# Patient Record
Sex: MALE | Race: WHITE | NOT HISPANIC OR LATINO | URBAN - METROPOLITAN AREA
[De-identification: names, ages, dates, MRNs, and addresses within clinical notes are randomized per-mention and may not be internally consistent; named-entity substitution may affect disease eponyms.]

---

## 2017-10-04 ENCOUNTER — INPATIENT (INPATIENT)
Facility: HOSPITAL | Age: 47
LOS: 4 days | Discharge: HOME | End: 2017-10-09
Attending: INTERNAL MEDICINE

## 2017-10-04 DIAGNOSIS — F10.20 ALCOHOL DEPENDENCE, UNCOMPLICATED: ICD-10-CM

## 2017-10-04 DIAGNOSIS — F93.0 SEPARATION ANXIETY DISORDER OF CHILDHOOD: ICD-10-CM

## 2017-10-12 DIAGNOSIS — Z63.0 PROBLEMS IN RELATIONSHIP WITH SPOUSE OR PARTNER: ICD-10-CM

## 2017-10-12 DIAGNOSIS — Z63.4 DISAPPEARANCE AND DEATH OF FAMILY MEMBER: ICD-10-CM

## 2017-10-12 DIAGNOSIS — F13.20 SEDATIVE, HYPNOTIC OR ANXIOLYTIC DEPENDENCE, UNCOMPLICATED: ICD-10-CM

## 2017-10-12 DIAGNOSIS — F32.9 MAJOR DEPRESSIVE DISORDER, SINGLE EPISODE, UNSPECIFIED: ICD-10-CM

## 2017-10-12 DIAGNOSIS — R45.851 SUICIDAL IDEATIONS: ICD-10-CM

## 2017-10-12 DIAGNOSIS — F11.20 OPIOID DEPENDENCE, UNCOMPLICATED: ICD-10-CM

## 2017-10-12 DIAGNOSIS — F17.210 NICOTINE DEPENDENCE, CIGARETTES, UNCOMPLICATED: ICD-10-CM
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
## 2017-10-12 DIAGNOSIS — F10.20 ALCOHOL DEPENDENCE, UNCOMPLICATED: ICD-10-CM

## 2017-10-12 DIAGNOSIS — F43.10 POST-TRAUMATIC STRESS DISORDER, UNSPECIFIED: ICD-10-CM

## 2017-10-12 DIAGNOSIS — F12.20 CANNABIS DEPENDENCE, UNCOMPLICATED: ICD-10-CM

## 2017-10-12 DIAGNOSIS — I10 ESSENTIAL (PRIMARY) HYPERTENSION: ICD-10-CM

## 2017-10-12 SDOH — SOCIAL STABILITY - SOCIAL INSECURITY: PROBLEMS IN RELATIONSHIP WITH SPOUSE OR PARTNER: Z63.0

## 2017-10-12 SDOH — SOCIAL STABILITY - SOCIAL INSECURITY: DISSAPEARANCE AND DEATH OF FAMILY MEMBER: Z63.4

## 2017-10-17 ENCOUNTER — OUTPATIENT (OUTPATIENT)
Dept: OUTPATIENT SERVICES | Facility: HOSPITAL | Age: 47
LOS: 1 days | Discharge: HOME | End: 2017-10-17

## 2017-10-17 DIAGNOSIS — F10.20 ALCOHOL DEPENDENCE, UNCOMPLICATED: ICD-10-CM

## 2017-10-17 DIAGNOSIS — F93.0 SEPARATION ANXIETY DISORDER OF CHILDHOOD: ICD-10-CM

## 2017-11-14 ENCOUNTER — OUTPATIENT (OUTPATIENT)
Dept: OUTPATIENT SERVICES | Facility: HOSPITAL | Age: 47
LOS: 1 days | Discharge: HOME | End: 2017-11-14

## 2017-11-14 DIAGNOSIS — F10.20 ALCOHOL DEPENDENCE, UNCOMPLICATED: ICD-10-CM

## 2018-05-05 ENCOUNTER — EMERGENCY (EMERGENCY)
Facility: HOSPITAL | Age: 48
LOS: 0 days | Discharge: HOME | End: 2018-05-05
Attending: EMERGENCY MEDICINE | Admitting: EMERGENCY MEDICINE

## 2018-05-05 VITALS
TEMPERATURE: 98 F | DIASTOLIC BLOOD PRESSURE: 92 MMHG | RESPIRATION RATE: 16 BRPM | SYSTOLIC BLOOD PRESSURE: 158 MMHG | OXYGEN SATURATION: 99 % | WEIGHT: 179.9 LBS | HEIGHT: 70 IN | HEART RATE: 91 BPM

## 2018-05-05 VITALS
RESPIRATION RATE: 18 BRPM | DIASTOLIC BLOOD PRESSURE: 88 MMHG | OXYGEN SATURATION: 99 % | HEART RATE: 75 BPM | TEMPERATURE: 97 F | SYSTOLIC BLOOD PRESSURE: 150 MMHG

## 2018-05-05 DIAGNOSIS — F17.200 NICOTINE DEPENDENCE, UNSPECIFIED, UNCOMPLICATED: ICD-10-CM

## 2018-05-05 DIAGNOSIS — F32.9 MAJOR DEPRESSIVE DISORDER, SINGLE EPISODE, UNSPECIFIED: ICD-10-CM

## 2018-05-05 DIAGNOSIS — R10.10 UPPER ABDOMINAL PAIN, UNSPECIFIED: ICD-10-CM

## 2018-05-05 DIAGNOSIS — Z88.8 ALLERGY STATUS TO OTHER DRUGS, MEDICAMENTS AND BIOLOGICAL SUBSTANCES: ICD-10-CM

## 2018-05-05 DIAGNOSIS — I10 ESSENTIAL (PRIMARY) HYPERTENSION: ICD-10-CM

## 2018-05-05 DIAGNOSIS — F13.10 SEDATIVE, HYPNOTIC OR ANXIOLYTIC ABUSE, UNCOMPLICATED: ICD-10-CM

## 2018-05-05 LAB
ALBUMIN SERPL ELPH-MCNC: 4.5 G/DL — SIGNIFICANT CHANGE UP (ref 3.5–5.2)
ALP SERPL-CCNC: 111 U/L — SIGNIFICANT CHANGE UP (ref 30–115)
ALT FLD-CCNC: 25 U/L — SIGNIFICANT CHANGE UP (ref 0–41)
ANION GAP SERPL CALC-SCNC: 11 MMOL/L — SIGNIFICANT CHANGE UP (ref 7–14)
AST SERPL-CCNC: 47 U/L — HIGH (ref 0–41)
BILIRUB SERPL-MCNC: 1.1 MG/DL — SIGNIFICANT CHANGE UP (ref 0.2–1.2)
BUN SERPL-MCNC: 18 MG/DL — SIGNIFICANT CHANGE UP (ref 10–20)
CALCIUM SERPL-MCNC: 9.4 MG/DL — SIGNIFICANT CHANGE UP (ref 8.5–10.1)
CHLORIDE SERPL-SCNC: 97 MMOL/L — LOW (ref 98–110)
CK MB CFR SERPL CALC: 10 NG/ML — HIGH (ref 0.6–6.3)
CK SERPL-CCNC: 845 U/L — HIGH (ref 0–225)
CO2 SERPL-SCNC: 29 MMOL/L — SIGNIFICANT CHANGE UP (ref 17–32)
CREAT SERPL-MCNC: 1 MG/DL — SIGNIFICANT CHANGE UP (ref 0.7–1.5)
GLUCOSE SERPL-MCNC: 106 MG/DL — HIGH (ref 70–99)
HCT VFR BLD CALC: 43.2 % — SIGNIFICANT CHANGE UP (ref 42–52)
HGB BLD-MCNC: 14.8 G/DL — SIGNIFICANT CHANGE UP (ref 14–18)
LIDOCAIN IGE QN: 58 U/L — SIGNIFICANT CHANGE UP (ref 7–60)
MCHC RBC-ENTMCNC: 29.4 PG — SIGNIFICANT CHANGE UP (ref 27–31)
MCHC RBC-ENTMCNC: 34.3 G/DL — SIGNIFICANT CHANGE UP (ref 32–37)
MCV RBC AUTO: 85.7 FL — SIGNIFICANT CHANGE UP (ref 80–94)
NRBC # BLD: 0 /100 WBCS — SIGNIFICANT CHANGE UP (ref 0–0)
PLATELET # BLD AUTO: 225 K/UL — SIGNIFICANT CHANGE UP (ref 130–400)
POTASSIUM SERPL-MCNC: 3.8 MMOL/L — SIGNIFICANT CHANGE UP (ref 3.5–5)
POTASSIUM SERPL-SCNC: 3.8 MMOL/L — SIGNIFICANT CHANGE UP (ref 3.5–5)
PROT SERPL-MCNC: 7 G/DL — SIGNIFICANT CHANGE UP (ref 6–8)
RBC # BLD: 5.04 M/UL — SIGNIFICANT CHANGE UP (ref 4.7–6.1)
RBC # FLD: 12.6 % — SIGNIFICANT CHANGE UP (ref 11.5–14.5)
SODIUM SERPL-SCNC: 137 MMOL/L — SIGNIFICANT CHANGE UP (ref 135–146)
TROPONIN T SERPL-MCNC: <0.01 NG/ML — SIGNIFICANT CHANGE UP
WBC # BLD: 8.84 K/UL — SIGNIFICANT CHANGE UP (ref 4.8–10.8)
WBC # FLD AUTO: 8.84 K/UL — SIGNIFICANT CHANGE UP (ref 4.8–10.8)

## 2018-05-05 RX ADMIN — Medication 50 MILLIGRAM(S): at 06:45

## 2018-05-05 NOTE — ED PROVIDER NOTE - OBJECTIVE STATEMENT
47m w a hx of HTN and polysubstance abuse. Pt presents requesting detox eval. Pt reports last drink was 2 hours prior to arrival. Pt reporting no symptoms at this time. Pt denies SI/HI, denies self-harm attempt or OD, denies AV hallucinations, denies tremors, denies seizures. Pt reports an episode of chest pain 3 days prior while at rest that resolved. No prior hx of CAD/DVT/PE, no recent travel/hosp/immobilization. Pt also reports "if there's no detox availability, I'd like to stay in psych"

## 2018-05-05 NOTE — ED ADULT NURSE NOTE - CHIEF COMPLAINT QUOTE
Pt requesting detox from alcohol and xanax. Pt also would like to speak with a psychiatrist for his PTSD. Pt denies HI and SI.

## 2018-05-05 NOTE — ED PROVIDER NOTE - NS ED ROS FT
Review of Systems  Constitutional:  No fever or chills.   Eyes:  Negative  ENMT:  No nasal congestion, discharge, or throat pain.  Cardiac:  No palpitations, syncope, or edema.  Respiratory:  No dyspnea, wheezing, or cough. No hemoptysis.  GI:  No vomiting, diarrhea, or abdominal pain. No melena or hematochezia.  :  No dysuria or hematuria.   Musculoskeletal:  No joint swelling or joint pain. Chronic back pain unchanged  Skin:  No skin rash, jaundice, or lesions.  Neuro:  No headache, loss of sensation, tremors, or focal weakness.  No change in mental status, no AV hallucinations.

## 2018-05-05 NOTE — ED ADULT NURSE NOTE - PMH
Alcohol abuse    Benzodiazepine abuse    Depression    HTN (hypertension)    PTSD (post-traumatic stress disorder)

## 2018-05-05 NOTE — ED PROVIDER NOTE - PHYSICAL EXAMINATION
Physical Exam  General: Awake, alert, NAD, WDWN, non-toxic appearing, NCAT  Eyes: PERRL, EOMI, no icterus, lids and conjunctivae are normal  ENT: External inspection normal, pink/moist membranes, pharynx normal  CV: S1S2, regular rate and rhythm, no murmur/gallops/rubs, no JVD, 2+ pulses b/l, no edema/cords/homans, warm/well-perfused  Respiratory: Normal respiratory rate/effort, no respiratory distress, normal voice, speaking full sentences, lungs clear to auscultation b/l, no wheezing/rales/rhonchi, no retractions, no stridor  Abdomen: Soft abdomen, no tender/distended/guarding/rebound, no CVA tender  Musculoskeletal: FROM all 4 extremities, N/V intact, no ranulfo tender/deform, stable gait  Neck: FROM neck, supple, no meningismus, trachea midline, no JVD  Integumentary: Color normal for race, warm and dry, no rash  Neuro: Oriented x3, CN 2-12 intact, normal motor, normal sensory, normal gait, normal cerebellar, no tremors, no clonus/rigidity/hyper-reflexia.  Psych: Oriented x3, mood normal, affect normal, denies SI/HI, denies AV hallucinations

## 2018-05-05 NOTE — ED PROVIDER NOTE - PMH
Alcohol abuse    Benzodiazepine abuse    Depression    HTN (hypertension)    PTSD (post-traumatic stress disorder) Alcohol abuse    Benzodiazepine abuse    Depression    HTN (hypertension)    Opioid abuse    PTSD (post-traumatic stress disorder)

## 2018-05-05 NOTE — ED PROVIDER NOTE - MEDICAL DECISION MAKING DETAILS
47m w polysubstance abuse requesting detox eval. also reporting episode of chest pain 3 days ago that resolved. no evidence of withdrawal during eval or ED observation. labs & EKG checked. librium given prior to d/c. Pt advised regarding symptomatic/supportive care, importance of PMD & outpatient Detox f/u, and symptoms to prompt ED return. DISPLAY PLAN FREE TEXT

## 2018-05-05 NOTE — ED PROVIDER NOTE - PLAN OF CARE
47m w polysubstance abuse requesting detox eval. also reporting episode of chest pain 3 days ago that resolved. --Will check EKG/enzymes, discuss care w detox for bed availability

## 2018-05-05 NOTE — ED PROVIDER NOTE - CARE PLAN
Assessment and plan of treatment:	47m w polysubstance abuse requesting detox eval. also reporting episode of chest pain 3 days ago that resolved. --Will check EKG/enzymes, discuss care w detox for bed availability Principal Discharge DX:	Alcohol abuse  Assessment and plan of treatment:	47m w polysubstance abuse requesting detox eval. also reporting episode of chest pain 3 days ago that resolved. --Will check EKG/enzymes, discuss care w detox for bed availability  Secondary Diagnosis:	Benzodiazepine abuse

## 2018-05-07 ENCOUNTER — INPATIENT (INPATIENT)
Facility: HOSPITAL | Age: 48
LOS: 7 days | Discharge: LEFT BEFORE DISCHARGE | End: 2018-05-15
Attending: INTERNAL MEDICINE | Admitting: INTERNAL MEDICINE

## 2018-05-07 VITALS
HEIGHT: 70 IN | OXYGEN SATURATION: 96 % | HEART RATE: 91 BPM | DIASTOLIC BLOOD PRESSURE: 97 MMHG | WEIGHT: 184.97 LBS | RESPIRATION RATE: 18 BRPM | TEMPERATURE: 97 F | SYSTOLIC BLOOD PRESSURE: 122 MMHG

## 2018-05-07 DIAGNOSIS — F19.20 OTHER PSYCHOACTIVE SUBSTANCE DEPENDENCE, UNCOMPLICATED: ICD-10-CM

## 2018-05-07 DIAGNOSIS — R45.851 SUICIDAL IDEATIONS: ICD-10-CM

## 2018-05-07 DIAGNOSIS — F43.10 POST-TRAUMATIC STRESS DISORDER, UNSPECIFIED: ICD-10-CM

## 2018-05-07 DIAGNOSIS — F41.9 ANXIETY DISORDER, UNSPECIFIED: ICD-10-CM

## 2018-05-07 DIAGNOSIS — F32.9 MAJOR DEPRESSIVE DISORDER, SINGLE EPISODE, UNSPECIFIED: ICD-10-CM

## 2018-05-07 DIAGNOSIS — F11.20 OPIOID DEPENDENCE, UNCOMPLICATED: ICD-10-CM

## 2018-05-07 DIAGNOSIS — F17.210 NICOTINE DEPENDENCE, CIGARETTES, UNCOMPLICATED: ICD-10-CM

## 2018-05-07 DIAGNOSIS — F13.20 SEDATIVE, HYPNOTIC OR ANXIOLYTIC DEPENDENCE, UNCOMPLICATED: ICD-10-CM

## 2018-05-07 DIAGNOSIS — F10.20 ALCOHOL DEPENDENCE, UNCOMPLICATED: ICD-10-CM

## 2018-05-07 DIAGNOSIS — Z51.89 ENCOUNTER FOR OTHER SPECIFIED AFTERCARE: ICD-10-CM

## 2018-05-07 DIAGNOSIS — F12.20 CANNABIS DEPENDENCE, UNCOMPLICATED: ICD-10-CM

## 2018-05-07 DIAGNOSIS — I10 ESSENTIAL (PRIMARY) HYPERTENSION: ICD-10-CM

## 2018-05-07 LAB
ALBUMIN SERPL ELPH-MCNC: 4.4 G/DL — SIGNIFICANT CHANGE UP (ref 3.5–5.2)
ALP SERPL-CCNC: 107 U/L — SIGNIFICANT CHANGE UP (ref 30–115)
ALT FLD-CCNC: 24 U/L — SIGNIFICANT CHANGE UP (ref 0–41)
AMMONIA BLD-MCNC: 40 UMOL/L — SIGNIFICANT CHANGE UP (ref 11–55)
ANION GAP SERPL CALC-SCNC: 14 MMOL/L — SIGNIFICANT CHANGE UP (ref 7–14)
APAP SERPL-MCNC: <5 UG/ML — LOW (ref 10–30)
APTT BLD: 30.1 SEC — SIGNIFICANT CHANGE UP (ref 27–39.2)
AST SERPL-CCNC: 33 U/L — SIGNIFICANT CHANGE UP (ref 0–41)
BASOPHILS # BLD AUTO: 0.1 K/UL — SIGNIFICANT CHANGE UP (ref 0–0.2)
BASOPHILS NFR BLD AUTO: 1.1 % — HIGH (ref 0–1)
BILIRUB SERPL-MCNC: 0.7 MG/DL — SIGNIFICANT CHANGE UP (ref 0.2–1.2)
BUN SERPL-MCNC: 9 MG/DL — LOW (ref 10–20)
CALCIUM SERPL-MCNC: 8.8 MG/DL — SIGNIFICANT CHANGE UP (ref 8.5–10.1)
CHLORIDE SERPL-SCNC: 100 MMOL/L — SIGNIFICANT CHANGE UP (ref 98–110)
CO2 SERPL-SCNC: 22 MMOL/L — SIGNIFICANT CHANGE UP (ref 17–32)
CREAT SERPL-MCNC: 0.9 MG/DL — SIGNIFICANT CHANGE UP (ref 0.7–1.5)
EOSINOPHIL # BLD AUTO: 0.23 K/UL — SIGNIFICANT CHANGE UP (ref 0–0.7)
EOSINOPHIL NFR BLD AUTO: 2.6 % — SIGNIFICANT CHANGE UP (ref 0–8)
ETHANOL SERPL-MCNC: <10 MG/DL — HIGH
GLUCOSE SERPL-MCNC: 101 MG/DL — HIGH (ref 70–99)
HCT VFR BLD CALC: 45.6 % — SIGNIFICANT CHANGE UP (ref 42–52)
HGB BLD-MCNC: 15.3 G/DL — SIGNIFICANT CHANGE UP (ref 14–18)
IMM GRANULOCYTES NFR BLD AUTO: 0.3 % — SIGNIFICANT CHANGE UP (ref 0.1–0.3)
INR BLD: 1.03 RATIO — SIGNIFICANT CHANGE UP (ref 0.65–1.3)
LIDOCAIN IGE QN: 49 U/L — SIGNIFICANT CHANGE UP (ref 7–60)
LYMPHOCYTES # BLD AUTO: 1.67 K/UL — SIGNIFICANT CHANGE UP (ref 1.2–3.4)
LYMPHOCYTES # BLD AUTO: 18.5 % — LOW (ref 20.5–51.1)
MAGNESIUM SERPL-MCNC: 1.8 MG/DL — SIGNIFICANT CHANGE UP (ref 1.8–2.4)
MCHC RBC-ENTMCNC: 29 PG — SIGNIFICANT CHANGE UP (ref 27–31)
MCHC RBC-ENTMCNC: 33.6 G/DL — SIGNIFICANT CHANGE UP (ref 32–37)
MCV RBC AUTO: 86.4 FL — SIGNIFICANT CHANGE UP (ref 80–94)
MONOCYTES # BLD AUTO: 0.6 K/UL — SIGNIFICANT CHANGE UP (ref 0.1–0.6)
MONOCYTES NFR BLD AUTO: 6.7 % — SIGNIFICANT CHANGE UP (ref 1.7–9.3)
NEUTROPHILS # BLD AUTO: 6.38 K/UL — SIGNIFICANT CHANGE UP (ref 1.4–6.5)
NEUTROPHILS NFR BLD AUTO: 70.8 % — SIGNIFICANT CHANGE UP (ref 42.2–75.2)
NRBC # BLD: 0 /100 WBCS — SIGNIFICANT CHANGE UP (ref 0–0)
PLATELET # BLD AUTO: 248 K/UL — SIGNIFICANT CHANGE UP (ref 130–400)
POTASSIUM SERPL-MCNC: 4 MMOL/L — SIGNIFICANT CHANGE UP (ref 3.5–5)
POTASSIUM SERPL-SCNC: 4 MMOL/L — SIGNIFICANT CHANGE UP (ref 3.5–5)
PROT SERPL-MCNC: 6.9 G/DL — SIGNIFICANT CHANGE UP (ref 6–8)
PROTHROM AB SERPL-ACNC: 11.1 SEC — SIGNIFICANT CHANGE UP (ref 9.95–12.87)
RBC # BLD: 5.28 M/UL — SIGNIFICANT CHANGE UP (ref 4.7–6.1)
RBC # FLD: 12.5 % — SIGNIFICANT CHANGE UP (ref 11.5–14.5)
SALICYLATES SERPL-MCNC: 0.5 MG/DL — LOW (ref 4–30)
SODIUM SERPL-SCNC: 136 MMOL/L — SIGNIFICANT CHANGE UP (ref 135–146)
WBC # BLD: 9.01 K/UL — SIGNIFICANT CHANGE UP (ref 4.8–10.8)
WBC # FLD AUTO: 9.01 K/UL — SIGNIFICANT CHANGE UP (ref 4.8–10.8)

## 2018-05-07 RX ORDER — PHENOBARBITAL 60 MG
32.4 TABLET ORAL EVERY 6 HOURS
Qty: 0 | Refills: 0 | Status: DISCONTINUED | OUTPATIENT
Start: 2018-05-09 | End: 2018-05-09

## 2018-05-07 RX ORDER — PHENOBARBITAL 60 MG
32.4 TABLET ORAL EVERY 12 HOURS
Qty: 0 | Refills: 0 | Status: DISCONTINUED | OUTPATIENT
Start: 2018-05-10 | End: 2018-05-11

## 2018-05-07 RX ORDER — PHENOBARBITAL 60 MG
32.4 TABLET ORAL EVERY 6 HOURS
Qty: 0 | Refills: 0 | Status: DISCONTINUED | OUTPATIENT
Start: 2018-05-07 | End: 2018-05-09

## 2018-05-07 RX ORDER — PHENOBARBITAL 60 MG
TABLET ORAL
Qty: 0 | Refills: 0 | Status: COMPLETED | OUTPATIENT
Start: 2018-05-07 | End: 2018-05-11

## 2018-05-07 RX ORDER — FOLIC ACID 0.8 MG
1 TABLET ORAL DAILY
Qty: 0 | Refills: 0 | Status: DISCONTINUED | OUTPATIENT
Start: 2018-05-07 | End: 2018-05-11

## 2018-05-07 RX ORDER — HYDROXYZINE HCL 10 MG
100 TABLET ORAL AT BEDTIME
Qty: 0 | Refills: 0 | Status: DISCONTINUED | OUTPATIENT
Start: 2018-05-07 | End: 2018-05-11

## 2018-05-07 RX ORDER — THIAMINE MONONITRATE (VIT B1) 100 MG
100 TABLET ORAL DAILY
Qty: 0 | Refills: 0 | Status: DISCONTINUED | OUTPATIENT
Start: 2018-05-07 | End: 2018-05-11

## 2018-05-07 RX ORDER — ACETAMINOPHEN 500 MG
650 TABLET ORAL EVERY 6 HOURS
Qty: 0 | Refills: 0 | Status: DISCONTINUED | OUTPATIENT
Start: 2018-05-07 | End: 2018-05-11

## 2018-05-07 RX ORDER — PHENOBARBITAL 60 MG
32.4 TABLET ORAL ONCE
Qty: 0 | Refills: 0 | Status: DISCONTINUED | OUTPATIENT
Start: 2018-05-07 | End: 2018-05-07

## 2018-05-07 RX ADMIN — Medication 32.4 MILLIGRAM(S): at 23:38

## 2018-05-07 NOTE — ED ADULT NURSE REASSESSMENT NOTE - NS ED NURSE REASSESS COMMENT FT1
Pt asleep; calm/ cooperative; appears comfortable; 1:1 observation in progress. Safety/environment checked.
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
## 2018-05-07 NOTE — CONSULT NOTE ADULT - ASSESSMENT
alcohol intoxication dependence   benzo abuse    no need for psych intervention  cleared for in patient detox admission.

## 2018-05-07 NOTE — H&P ADULT - ATTENDING COMMENTS
Patient interviewed and examined.    Chart reviewed.    PA's H&P noted and modified, as appropriate.    Case discussed on team rounds    Following is my summary of the case.    Admitted for detox: from __X__ED, ___Intake, ____Med/Surg Floor    Alcohol__X__   Opioid_____  Benzo_X__ Other_____    Substance amount, duration of use, last usage, and prior attempts at detox or rehabs, are outlined above in the H&P and discussed with patient.    Associated withdrawal symptoms presents.  Comorbid conditions noted. Chronic and Stable.    Past Medical Hx, Psych Hx, family Hx, Social Hx from H&P reviewed and NO changes.    Old medical record and medication Hx. Reviewed    Following items reviewed and addressed:  1. labs  2. EKG  3. Imaging from PACs module    Examination: no change from PA's exam.    Place on following protocol  _____Medically Managed  __X__Medically Supervised    Ciwa_____Librium taper____Ativan taper___Methadone taper___ Phenobarb taper_X___ Suboxone Induction____MMTP____    Narcan Kit Offered    Psych Consult __X__N/A  ___Ordered    Physical Therapy  ___X N/A    ___  Ordered    Aftercare disposition to be addressed by counselors.    Estimated length of stay 3-5 days.

## 2018-05-07 NOTE — ED PROVIDER NOTE - ATTENDING CONTRIBUTION TO CARE
I personally evaluated the patient. I reviewed the Resident’s or Physician Assistant’s note (as assigned above), and agree with the findings and plan except as documented in my note. Pt comes in requesting detox from alcohol and benzos. Last drink several hours PTA. No trauma, no SI/HI/AVH. Not tremulous, no fever, chills. On exam: NCAT. PERRLA, EOMI. OP clear. Lungs CTAB. RRR, S1S2 noted. Radial pulses 2+ and equal, pedal pulses 2+ and equal. Abdomen soft, NT/ND, no rebound or guarding. FROM x4 extremities. No focal neuro deficits.   Psych consult and cleared for detox.

## 2018-05-07 NOTE — CONSULT NOTE ADULT - SUBJECTIVE AND OBJECTIVE BOX
47m w a hx of HTN and polysubstance abuse. Pt presented to intake  requesting detox eval. Pt reports last drink was 2 hours prior to arrival. was sent to ed for psych evaluation. he reports he was high and did not know what he said and why he was sent here. denies any psych symptoms at this time. Pt denies SI/HI, denies self-harm attempt or OD, denies AV hallucinations, denies tremors, denies seizures.     hx of alcohol, benzo dependence. multiple detox and rehab no ipp not on any psych medications.    alert ox3 mood stable no threat to self or others. i/j fair.

## 2018-05-07 NOTE — ED PROVIDER NOTE - OBJECTIVE STATEMENT
Sent from intake for psych clearance, Patient requests detox, Denies any suicidal idations Sent from intake for psych clearance, Patient requests detox, Denies any suicidal ideations

## 2018-05-08 LAB — T PALLIDUM AB TITR SER: NEGATIVE — SIGNIFICANT CHANGE UP

## 2018-05-08 RX ORDER — PHENOBARBITAL 60 MG
32.4 TABLET ORAL EVERY 6 HOURS
Qty: 0 | Refills: 0 | Status: DISCONTINUED | OUTPATIENT
Start: 2018-05-08 | End: 2018-05-11

## 2018-05-08 RX ORDER — TUBERCULIN PURIFIED PROTEIN DERIVATIVE 5 [IU]/.1ML
5 INJECTION, SOLUTION INTRADERMAL ONCE
Qty: 0 | Refills: 0 | Status: COMPLETED | OUTPATIENT
Start: 2018-05-08 | End: 2018-05-09

## 2018-05-08 RX ADMIN — Medication 32.4 MILLIGRAM(S): at 17:32

## 2018-05-08 RX ADMIN — Medication 1 MILLIGRAM(S): at 11:58

## 2018-05-08 RX ADMIN — Medication 32.4 MILLIGRAM(S): at 05:56

## 2018-05-08 RX ADMIN — Medication 100 MILLIGRAM(S): at 09:43

## 2018-05-08 RX ADMIN — Medication 32.4 MILLIGRAM(S): at 11:58

## 2018-05-08 RX ADMIN — Medication 100 MILLIGRAM(S): at 01:09

## 2018-05-08 RX ADMIN — Medication 32.4 MILLIGRAM(S): at 01:09

## 2018-05-08 RX ADMIN — Medication 100 MILLIGRAM(S): at 22:49

## 2018-05-08 RX ADMIN — Medication 1 TABLET(S): at 09:43

## 2018-05-08 RX ADMIN — Medication 32.4 MILLIGRAM(S): at 09:43

## 2018-05-09 RX ORDER — NICOTINE POLACRILEX 2 MG
1 GUM BUCCAL DAILY
Qty: 0 | Refills: 0 | Status: DISCONTINUED | OUTPATIENT
Start: 2018-05-09 | End: 2018-05-11

## 2018-05-09 RX ADMIN — Medication 1 TABLET(S): at 08:35

## 2018-05-09 RX ADMIN — Medication 32.4 MILLIGRAM(S): at 00:57

## 2018-05-09 RX ADMIN — Medication 32.4 MILLIGRAM(S): at 11:22

## 2018-05-09 RX ADMIN — Medication 1 MILLIGRAM(S): at 08:35

## 2018-05-09 RX ADMIN — Medication 100 MILLIGRAM(S): at 08:35

## 2018-05-09 RX ADMIN — Medication 32.4 MILLIGRAM(S): at 17:19

## 2018-05-09 RX ADMIN — Medication 32.4 MILLIGRAM(S): at 06:09

## 2018-05-09 RX ADMIN — TUBERCULIN PURIFIED PROTEIN DERIVATIVE 5 UNIT(S): 5 INJECTION, SOLUTION INTRADERMAL at 14:44

## 2018-05-09 RX ADMIN — Medication 1 PATCH: at 09:13

## 2018-05-09 NOTE — CHART NOTE - NSCHARTNOTEFT_GEN_A_CORE
Subsequent Inpatient Encounter                                       Detox Unit    KELLY ANDRADE   47y   Male      Chief Complaint:    Follow up for Benzodiazipine  Dependency    HPI:     I reviewed previous notes.No Change, except if noted below.             Detail:_    ROS:   I reviewed with patient.  No changes from previous notes except if noted below.             Detail: _    PFSH I reviewed with patient. No changes from previous notes except if noted below.             Detail_    Medication reconciliation performed.    MEDICATIONS  (STANDING):  folic acid 1 milliGRAM(s) Oral daily  multivitamin 1 Tablet(s) Oral daily  PHENobarbital 32.4 milliGRAM(s) Oral every 6 hours  PHENobarbital   Oral   PPD  5 Tuberculin Unit(s) Injectable 5 Unit(s) IntraDermal once  thiamine 100 milliGRAM(s) Oral daily      MEDICATIONS  (PRN):  acetaminophen   Tablet 650 milliGRAM(s) Oral every 6 hours PRN For Temp greater than 38 C (100.4 F)  acetaminophen   Tablet. 650 milliGRAM(s) Oral every 6 hours PRN Mild Pain (1 - 3)  aluminum hydroxide/magnesium hydroxide/simethicone Suspension 30 milliLiter(s) Oral every 4 hours PRN Dyspepsia  hydrOXYzine hydrochloride 100 milliGRAM(s) Oral at bedtime PRN sleep  PHENobarbital 32.4 milliGRAM(s) Oral every 6 hours PRN wd      T(C): 35.9 (05-09-18 @ 06:19), Max: 36.2 (05-08-18 @ 07:55)  HR: 65 (05-09-18 @ 06:19) (53 - 69)  BP: 126/84 (05-09-18 @ 06:19) (126/84 - 145/86)  RR: 16 (05-09-18 @ 06:19) (16 - 16)  SpO2: --    PHYSICAL EXAM:      Constitutional: NAD, A&O x3    Eyes: PERRLA, no conjuctivitis    Neck: no lymphadenopathy    Respiratory: +air entry, no rales, no rhonchi, no wheezes    Cardiovascular: +S1 and S2, regular rate and rhythm    Gastrointestinal: +BS, soft, non-tender, not distended    Extremities:  no edema, no calf tenderness    Skin: no rashes, normal turgor                            15.3   9.01  )-----------( 248      ( 07 May 2018 14:49 )             45.6   05-07    136  |  100  |  9<L>  ----------------------------<  101<H>  4.0   |  22  |  0.9    Ca    8.8      07 May 2018 14:49  Mg     1.8     05-07    TPro  6.9  /  Alb  4.4  /  TBili  0.7  /  DBili  x   /  AST  33  /  ALT  24  /  AlkPhos  107  05-07  PT/INR - ( 07 May 2018 14:49 )   PT: 11.10 sec;   INR: 1.03 ratio         PTT - ( 07 May 2018 14:49 )  PTT:30.1 sec  Magnesium, Serum: 1.8 mg/dL (05-07-18 @ 14:49)  Ammonia, Serum: 40 umol/L (05-07-18 @ 14:49)  Treponema Pallidum Antibody Interpretation: Negative (05-07-18 @ 14:49)        Drug Screen 1, Urine Result: Done (05-07-18 @ 16:19)        Impression and Plan:    Primary Diagnosis:  Benzodiazipine Dependency                                Medication: Phenobarbitol Protocol    Secondary Diagnosis:                                                                                Medication:    Tertiary Diagnosis:                                                                                     Medication:      Continue Detox Protocols. Use of PRNS as needed for withdrawal and comfort.    Adjustments to protocols:    Labs/ Tests reviewed.    Tests ordered:     Likely Disposition: __x_Home       ___Rehab       ___Outpatient Program    ___Self Help     _____Other    Estimated Length of stay:__4__

## 2018-05-10 RX ORDER — TRAZODONE HCL 50 MG
100 TABLET ORAL AT BEDTIME
Qty: 0 | Refills: 0 | Status: DISCONTINUED | OUTPATIENT
Start: 2018-05-10 | End: 2018-05-11

## 2018-05-10 RX ADMIN — Medication 0.1 MILLIGRAM(S): at 07:43

## 2018-05-10 RX ADMIN — Medication 100 MILLIGRAM(S): at 22:04

## 2018-05-10 RX ADMIN — Medication 32.4 MILLIGRAM(S): at 07:43

## 2018-05-10 RX ADMIN — Medication 1 TABLET(S): at 09:05

## 2018-05-10 RX ADMIN — Medication 32.4 MILLIGRAM(S): at 17:37

## 2018-05-10 RX ADMIN — Medication 1 MILLIGRAM(S): at 09:05

## 2018-05-10 RX ADMIN — Medication 1 PATCH: at 09:05

## 2018-05-10 RX ADMIN — Medication 100 MILLIGRAM(S): at 09:05

## 2018-05-10 RX ADMIN — Medication 0.1 MILLIGRAM(S): at 14:14

## 2018-05-11 ENCOUNTER — INPATIENT (INPATIENT)
Facility: HOSPITAL | Age: 48
LOS: 3 days | Discharge: LEFT BEFORE DISCHARGE | End: 2018-05-15
Attending: INTERNAL MEDICINE | Admitting: INTERNAL MEDICINE

## 2018-05-11 VITALS
TEMPERATURE: 96 F | RESPIRATION RATE: 16 BRPM | HEART RATE: 76 BPM | WEIGHT: 179.9 LBS | DIASTOLIC BLOOD PRESSURE: 79 MMHG | SYSTOLIC BLOOD PRESSURE: 124 MMHG | HEIGHT: 70 IN

## 2018-05-11 VITALS
HEART RATE: 89 BPM | SYSTOLIC BLOOD PRESSURE: 127 MMHG | RESPIRATION RATE: 16 BRPM | DIASTOLIC BLOOD PRESSURE: 72 MMHG | TEMPERATURE: 98 F

## 2018-05-11 DIAGNOSIS — F10.20 ALCOHOL DEPENDENCE, UNCOMPLICATED: ICD-10-CM

## 2018-05-11 DIAGNOSIS — Z02.9 ENCOUNTER FOR ADMINISTRATIVE EXAMINATIONS, UNSPECIFIED: ICD-10-CM

## 2018-05-11 RX ORDER — HYDROXYZINE HCL 10 MG
50 TABLET ORAL EVERY 6 HOURS
Qty: 0 | Refills: 0 | Status: DISCONTINUED | OUTPATIENT
Start: 2018-05-11 | End: 2018-05-15

## 2018-05-11 RX ORDER — TRAZODONE HCL 50 MG
100 TABLET ORAL ONCE
Qty: 0 | Refills: 0 | Status: COMPLETED | OUTPATIENT
Start: 2018-05-11 | End: 2018-05-11

## 2018-05-11 RX ORDER — MAGNESIUM HYDROXIDE 400 MG/1
30 TABLET, CHEWABLE ORAL DAILY
Qty: 0 | Refills: 0 | Status: DISCONTINUED | OUTPATIENT
Start: 2018-05-11 | End: 2018-05-15

## 2018-05-11 RX ORDER — ACETAMINOPHEN 500 MG
650 TABLET ORAL EVERY 8 HOURS
Qty: 0 | Refills: 0 | Status: DISCONTINUED | OUTPATIENT
Start: 2018-05-11 | End: 2018-05-15

## 2018-05-11 RX ORDER — HYDROXYZINE HCL 10 MG
100 TABLET ORAL AT BEDTIME
Qty: 0 | Refills: 0 | Status: DISCONTINUED | OUTPATIENT
Start: 2018-05-11 | End: 2018-05-15

## 2018-05-11 RX ORDER — TRAZODONE HCL 50 MG
100 TABLET ORAL AT BEDTIME
Qty: 0 | Refills: 0 | Status: DISCONTINUED | OUTPATIENT
Start: 2018-05-11 | End: 2018-05-15

## 2018-05-11 RX ADMIN — Medication 100 MILLIGRAM(S): at 22:43

## 2018-05-11 RX ADMIN — Medication 1 PATCH: at 09:02

## 2018-05-11 RX ADMIN — Medication 32.4 MILLIGRAM(S): at 06:26

## 2018-05-11 RX ADMIN — Medication 100 MILLIGRAM(S): at 09:02

## 2018-05-11 RX ADMIN — Medication 1 TABLET(S): at 09:02

## 2018-05-11 RX ADMIN — MAGNESIUM HYDROXIDE 30 MILLILITER(S): 400 TABLET, CHEWABLE ORAL at 17:42

## 2018-05-11 RX ADMIN — Medication 1 MILLIGRAM(S): at 09:02

## 2018-05-11 NOTE — CHART NOTE - NSCHARTNOTEFT_GEN_A_CORE
Allergies:  NSAIDs     Diet: Regular    Activity: as tolerated    Follow up with    1. PMD in 2 weeks    2. Psych in 2 weeks    3.    Follow up for abnormal labs/tests    1.    Extra Instructions:      Flu Vaccine given  Yes_____         No______      Diagnosis:  Chemical Dependency   Maintain sobriety  refrain from all use      Patient Signature___________________________________________  Date_________________      Nurse Signature_____________________________________________Date_________________

## 2018-05-12 RX ADMIN — Medication 1 TABLET(S): at 08:58

## 2018-05-12 RX ADMIN — Medication 100 MILLIGRAM(S): at 21:16

## 2018-05-13 RX ADMIN — Medication 100 MILLIGRAM(S): at 21:00

## 2018-05-13 RX ADMIN — Medication 100 MILLIGRAM(S): at 21:01

## 2018-05-13 RX ADMIN — MAGNESIUM HYDROXIDE 30 MILLILITER(S): 400 TABLET, CHEWABLE ORAL at 08:10

## 2018-05-14 RX ADMIN — Medication 1 TABLET(S): at 08:23

## 2018-05-15 VITALS
HEART RATE: 65 BPM | DIASTOLIC BLOOD PRESSURE: 85 MMHG | RESPIRATION RATE: 18 BRPM | TEMPERATURE: 98 F | SYSTOLIC BLOOD PRESSURE: 148 MMHG

## 2018-05-15 RX ADMIN — Medication 1 TABLET(S): at 08:25

## 2018-05-15 RX ADMIN — Medication 50 MILLIGRAM(S): at 08:26

## 2018-05-15 NOTE — CHART NOTE - NSCHARTNOTEFT_GEN_A_CORE
Allergies:  NSAIDs (Swelling)      Diet: Regular    Activity: as tolerated    Follow up with    1. PMD in 2 weeks    2. Psych in 2 weeks        Extra Instructions:      Flu Vaccine given  Yes_____         No______      Diagnosis:  Chemical Dependency   Maintain sobriety  refrain from all use      Patient Signature___________________________________________  Date_________________      Nurse Signature_____________________________________________Date_________________ Allergies:  NSAIDs (Swelling)      Diet: Regular    Activity: as tolerated    Follow up with    1. PMD in 2 weeks    2. Psych in 2 weeks        Extra Instructions:      Flu Vaccine given  Yes_____         No______      Diagnosis:  Chemical Dependency   Maintain sobriety  refrain from all use      Patient Signature___________________________________________  Date_________________      Nurse Signature_____________________________________________Date_________________    *Patient signed AMA.  Advised Patient about risks and verbalized understanding.  Advised Patient to follow up with PCP ASAP*

## 2018-05-17 ENCOUNTER — EMERGENCY (EMERGENCY)
Facility: HOSPITAL | Age: 48
LOS: 0 days | Discharge: HOME | End: 2018-05-17
Attending: EMERGENCY MEDICINE | Admitting: EMERGENCY MEDICINE

## 2018-05-17 VITALS
RESPIRATION RATE: 18 BRPM | SYSTOLIC BLOOD PRESSURE: 172 MMHG | HEIGHT: 70 IN | WEIGHT: 184.97 LBS | HEART RATE: 104 BPM | TEMPERATURE: 99 F | DIASTOLIC BLOOD PRESSURE: 91 MMHG

## 2018-05-17 DIAGNOSIS — R45.851 SUICIDAL IDEATIONS: ICD-10-CM

## 2018-05-17 DIAGNOSIS — Z88.8 ALLERGY STATUS TO OTHER DRUGS, MEDICAMENTS AND BIOLOGICAL SUBSTANCES STATUS: ICD-10-CM

## 2018-05-17 DIAGNOSIS — Z79.899 OTHER LONG TERM (CURRENT) DRUG THERAPY: ICD-10-CM

## 2018-05-17 DIAGNOSIS — F32.9 MAJOR DEPRESSIVE DISORDER, SINGLE EPISODE, UNSPECIFIED: ICD-10-CM

## 2018-05-17 LAB
ANION GAP SERPL CALC-SCNC: 15 MMOL/L — HIGH (ref 7–14)
APAP SERPL-MCNC: <5 UG/ML — LOW (ref 10–30)
BASOPHILS # BLD AUTO: 0.15 K/UL — SIGNIFICANT CHANGE UP (ref 0–0.2)
BASOPHILS NFR BLD AUTO: 1.5 % — HIGH (ref 0–1)
BUN SERPL-MCNC: 13 MG/DL — SIGNIFICANT CHANGE UP (ref 10–20)
CALCIUM SERPL-MCNC: 9.3 MG/DL — SIGNIFICANT CHANGE UP (ref 8.5–10.1)
CHLORIDE SERPL-SCNC: 95 MMOL/L — LOW (ref 98–110)
CO2 SERPL-SCNC: 24 MMOL/L — SIGNIFICANT CHANGE UP (ref 17–32)
CREAT SERPL-MCNC: 1 MG/DL — SIGNIFICANT CHANGE UP (ref 0.7–1.5)
EOSINOPHIL # BLD AUTO: 0.12 K/UL — SIGNIFICANT CHANGE UP (ref 0–0.7)
EOSINOPHIL NFR BLD AUTO: 1.2 % — SIGNIFICANT CHANGE UP (ref 0–8)
ETHANOL SERPL-MCNC: <10 MG/DL — HIGH
GLUCOSE SERPL-MCNC: 131 MG/DL — HIGH (ref 70–99)
HCT VFR BLD CALC: 44.7 % — SIGNIFICANT CHANGE UP (ref 42–52)
HGB BLD-MCNC: 15.7 G/DL — SIGNIFICANT CHANGE UP (ref 14–18)
IMM GRANULOCYTES NFR BLD AUTO: 0.3 % — SIGNIFICANT CHANGE UP (ref 0.1–0.3)
LYMPHOCYTES # BLD AUTO: 2.38 K/UL — SIGNIFICANT CHANGE UP (ref 1.2–3.4)
LYMPHOCYTES # BLD AUTO: 24.4 % — SIGNIFICANT CHANGE UP (ref 20.5–51.1)
MCHC RBC-ENTMCNC: 29.6 PG — SIGNIFICANT CHANGE UP (ref 27–31)
MCHC RBC-ENTMCNC: 35.1 G/DL — SIGNIFICANT CHANGE UP (ref 32–37)
MCV RBC AUTO: 84.2 FL — SIGNIFICANT CHANGE UP (ref 80–94)
MONOCYTES # BLD AUTO: 0.68 K/UL — HIGH (ref 0.1–0.6)
MONOCYTES NFR BLD AUTO: 7 % — SIGNIFICANT CHANGE UP (ref 1.7–9.3)
NEUTROPHILS # BLD AUTO: 6.4 K/UL — SIGNIFICANT CHANGE UP (ref 1.4–6.5)
NEUTROPHILS NFR BLD AUTO: 65.6 % — SIGNIFICANT CHANGE UP (ref 42.2–75.2)
PLATELET # BLD AUTO: 333 K/UL — SIGNIFICANT CHANGE UP (ref 130–400)
POTASSIUM SERPL-MCNC: 3.8 MMOL/L — SIGNIFICANT CHANGE UP (ref 3.5–5)
POTASSIUM SERPL-SCNC: 3.8 MMOL/L — SIGNIFICANT CHANGE UP (ref 3.5–5)
RBC # BLD: 5.31 M/UL — SIGNIFICANT CHANGE UP (ref 4.7–6.1)
RBC # FLD: 12.1 % — SIGNIFICANT CHANGE UP (ref 11.5–14.5)
SALICYLATES SERPL-MCNC: <0.3 MG/DL — LOW (ref 4–30)
SODIUM SERPL-SCNC: 134 MMOL/L — LOW (ref 135–146)
WBC # BLD: 9.76 K/UL — SIGNIFICANT CHANGE UP (ref 4.8–10.8)
WBC # FLD AUTO: 9.76 K/UL — SIGNIFICANT CHANGE UP (ref 4.8–10.8)

## 2018-05-17 NOTE — CONSULT NOTE ADULT - ASSESSMENT
sedative hypnotic dependence  alcohol dependence    no need for ipp  f/u out patient addiction program.

## 2018-05-17 NOTE — ED PROVIDER NOTE - ATTENDING CONTRIBUTION TO CARE
I personally evaluated the patient. I reviewed the Resident’s or Physician Assistant’s note (as assigned above), and agree with the findings and plan except as documented in my note.  pt calm and cooperative in ED cleared by psych

## 2018-05-17 NOTE — ED ADULT NURSE NOTE - NS TRANSFER PATIENT BELONGINGS
Jewelry/Money (specify)/Other belongings/Clothing/Cell Phone/PDA (specify)/money vouchered by security; pt has umbrella

## 2018-05-17 NOTE — ED ADULT NURSE NOTE - OBJECTIVE STATEMENT
Patient arrived to the ED alert and oriented x3 ambulatory for thoughts of suicide.  Pt states that he does not have a plan but states that he is depressed.  H/o PTSD and major depression.  Pt placed on constant observation and placed close to the nursing station.  Pt presents calm.  Awaiting psych consult.  Blood labs drawn and sent/  Will continue to monitor and reassess,

## 2018-05-17 NOTE — CONSULT NOTE ADULT - SUBJECTIVE AND OBJECTIVE BOX
· Chief Complaint: The patient is a 47y Male complaining of psychiatric medication refill.	  · HPI Objective Statement: 47 yr old male, PMH of depression and polysubstance abuse, presenting for suicidal ideation for the last 6 months, worse for the last few days, states he was in detox last week and left AMA 2 days ago because he claims the people around were making his thoughts of suicide worse. No plan, no hallucinations. Takes mirtazapine, sees a psychiatrist on SI.	    pt is known to me and service through recent admission to detox. continued use of benzos. currently comfortable sleeping and easily aroused and engaging.  no evidence of psychosis. no active s /h ideations.    recent ama from detox unit.    oriented x 3 mood stable no threat to self or others. no psychosis. i/j fair.

## 2018-05-17 NOTE — ED PROVIDER NOTE - NS ED ROS FT
Review of Systems:  	•	CONSTITUTIONAL - no fever, no diaphoresis, no chills  	•	SKIN - no rash  	•	RESPIRATORY - no shortness of breath, no cough  	•	CARDIAC - no chest pain, no palpitations  	•	GI - no abd pain, no nausea, no vomiting, no diarrhea  	•	NEUROLOGIC - no weakness, no headache, no paresthesias, no LOC  	•	PSYCH - +SI, no HI, no hallucinations

## 2018-05-17 NOTE — ED PROVIDER NOTE - OBJECTIVE STATEMENT
47 yr old male, PMH of depression and polysubstance abuse, presenting for suicidal ideation for the last 6 months, worse for the last few days, states he was in detox last week and left AMA 2 days ago because he claims the people around were making his thoughts of suicide worse. No plan, no hallucinations. Takes mirtazapine, sees a psychiatrist on SI.

## 2018-05-17 NOTE — ED ADULT NURSE NOTE - FALLEN IN THE PAST
Telephone call- Findings and recommendations of sleep study reviewed:  Severe obstructive sleep apnea with hypoxemia  aCPAP 5-15 pending quote from [facility] regarding cost on his BC/BS out of state coverage.    RTC 7 weeks if patient accepts CPAP   no

## 2018-05-17 NOTE — ED PROVIDER NOTE - PHYSICAL EXAMINATION
Alert, NAD, WDWN, well-appearing  PERRL, EOMI, normal pupils, no icterus, normal external ENT, pink/moist membranes  Airway intact, Lungs CTAB, no wheezing or rhonchi, normal resp effort w/o tachypnea, no retractions  CVS1S2, RRR, no m/g/r, 2+ pulses b/l, warm/well-perfused  Skin warm/dry, no acute rash  AAOx3, CN 2-12 intact, normal motor, normal sensory, normal gait

## 2018-05-23 ENCOUNTER — INPATIENT (INPATIENT)
Facility: HOSPITAL | Age: 48
LOS: 12 days | Discharge: HOME | End: 2018-06-05
Attending: PSYCHIATRY & NEUROLOGY | Admitting: PSYCHIATRY & NEUROLOGY

## 2018-05-23 VITALS
SYSTOLIC BLOOD PRESSURE: 158 MMHG | DIASTOLIC BLOOD PRESSURE: 102 MMHG | WEIGHT: 210.1 LBS | HEIGHT: 70 IN | RESPIRATION RATE: 22 BRPM | OXYGEN SATURATION: 97 % | HEART RATE: 91 BPM | TEMPERATURE: 98 F

## 2018-05-23 DIAGNOSIS — F43.10 POST-TRAUMATIC STRESS DISORDER, UNSPECIFIED: ICD-10-CM

## 2018-05-23 DIAGNOSIS — F10.10 ALCOHOL ABUSE, UNCOMPLICATED: ICD-10-CM

## 2018-05-23 DIAGNOSIS — F32.9 MAJOR DEPRESSIVE DISORDER, SINGLE EPISODE, UNSPECIFIED: ICD-10-CM

## 2018-05-23 DIAGNOSIS — F33.2 MAJOR DEPRESSIVE DISORDER, RECURRENT SEVERE WITHOUT PSYCHOTIC FEATURES: ICD-10-CM

## 2018-05-23 DIAGNOSIS — F13.10 SEDATIVE, HYPNOTIC OR ANXIOLYTIC ABUSE, UNCOMPLICATED: ICD-10-CM

## 2018-05-23 LAB
ALBUMIN SERPL ELPH-MCNC: 4.9 G/DL — SIGNIFICANT CHANGE UP (ref 3.5–5.2)
ALP SERPL-CCNC: 108 U/L — SIGNIFICANT CHANGE UP (ref 30–115)
ALT FLD-CCNC: 18 U/L — SIGNIFICANT CHANGE UP (ref 0–41)
AMPHET UR-MCNC: NEGATIVE — SIGNIFICANT CHANGE UP
ANION GAP SERPL CALC-SCNC: 13 MMOL/L — SIGNIFICANT CHANGE UP (ref 7–14)
APAP SERPL-MCNC: <5 UG/ML — LOW (ref 10–30)
APPEARANCE UR: CLEAR — SIGNIFICANT CHANGE UP
AST SERPL-CCNC: 26 U/L — SIGNIFICANT CHANGE UP (ref 0–41)
BARBITURATES UR SCN-MCNC: NEGATIVE — SIGNIFICANT CHANGE UP
BASOPHILS # BLD AUTO: 0.11 K/UL — SIGNIFICANT CHANGE UP (ref 0–0.2)
BASOPHILS NFR BLD AUTO: 1 % — SIGNIFICANT CHANGE UP (ref 0–1)
BENZODIAZ UR-MCNC: NEGATIVE — SIGNIFICANT CHANGE UP
BILIRUB DIRECT SERPL-MCNC: <0.2 MG/DL — SIGNIFICANT CHANGE UP (ref 0–0.2)
BILIRUB INDIRECT FLD-MCNC: >0.1 MG/DL — LOW (ref 0.2–1.2)
BILIRUB SERPL-MCNC: 0.3 MG/DL — SIGNIFICANT CHANGE UP (ref 0.2–1.2)
BILIRUB UR-MCNC: NEGATIVE — SIGNIFICANT CHANGE UP
BUN SERPL-MCNC: 17 MG/DL — SIGNIFICANT CHANGE UP (ref 10–20)
CALCIUM SERPL-MCNC: 9.6 MG/DL — SIGNIFICANT CHANGE UP (ref 8.5–10.1)
CHLORIDE SERPL-SCNC: 97 MMOL/L — LOW (ref 98–110)
CO2 SERPL-SCNC: 25 MMOL/L — SIGNIFICANT CHANGE UP (ref 17–32)
COCAINE METAB.OTHER UR-MCNC: NEGATIVE — SIGNIFICANT CHANGE UP
COLOR SPEC: YELLOW — SIGNIFICANT CHANGE UP
CREAT SERPL-MCNC: 1.1 MG/DL — SIGNIFICANT CHANGE UP (ref 0.7–1.5)
DIFF PNL FLD: NEGATIVE — SIGNIFICANT CHANGE UP
EOSINOPHIL # BLD AUTO: 0.31 K/UL — SIGNIFICANT CHANGE UP (ref 0–0.7)
EOSINOPHIL NFR BLD AUTO: 2.8 % — SIGNIFICANT CHANGE UP (ref 0–8)
ETHANOL SERPL-MCNC: <10 MG/DL — HIGH
GLUCOSE SERPL-MCNC: 105 MG/DL — HIGH (ref 70–99)
GLUCOSE UR QL: NEGATIVE MG/DL — SIGNIFICANT CHANGE UP
HCT VFR BLD CALC: 47.3 % — SIGNIFICANT CHANGE UP (ref 42–52)
HGB BLD-MCNC: 16 G/DL — SIGNIFICANT CHANGE UP (ref 14–18)
IMM GRANULOCYTES NFR BLD AUTO: 0.3 % — SIGNIFICANT CHANGE UP (ref 0.1–0.3)
KETONES UR-MCNC: NEGATIVE — SIGNIFICANT CHANGE UP
LEUKOCYTE ESTERASE UR-ACNC: NEGATIVE — SIGNIFICANT CHANGE UP
LYMPHOCYTES # BLD AUTO: 2.98 K/UL — SIGNIFICANT CHANGE UP (ref 1.2–3.4)
LYMPHOCYTES # BLD AUTO: 26.6 % — SIGNIFICANT CHANGE UP (ref 20.5–51.1)
MCHC RBC-ENTMCNC: 29 PG — SIGNIFICANT CHANGE UP (ref 27–31)
MCHC RBC-ENTMCNC: 33.8 G/DL — SIGNIFICANT CHANGE UP (ref 32–37)
MCV RBC AUTO: 85.8 FL — SIGNIFICANT CHANGE UP (ref 80–94)
METHADONE UR-MCNC: NEGATIVE — SIGNIFICANT CHANGE UP
MONOCYTES # BLD AUTO: 0.7 K/UL — HIGH (ref 0.1–0.6)
MONOCYTES NFR BLD AUTO: 6.3 % — SIGNIFICANT CHANGE UP (ref 1.7–9.3)
NEUTROPHILS # BLD AUTO: 7.07 K/UL — HIGH (ref 1.4–6.5)
NEUTROPHILS NFR BLD AUTO: 63 % — SIGNIFICANT CHANGE UP (ref 42.2–75.2)
NITRITE UR-MCNC: NEGATIVE — SIGNIFICANT CHANGE UP
NRBC # BLD: 0 /100 WBCS — SIGNIFICANT CHANGE UP (ref 0–0)
OPIATES UR-MCNC: NEGATIVE — SIGNIFICANT CHANGE UP
PCP SPEC-MCNC: SIGNIFICANT CHANGE UP
PH UR: 6.5 — SIGNIFICANT CHANGE UP (ref 5–8)
PLATELET # BLD AUTO: 343 K/UL — SIGNIFICANT CHANGE UP (ref 130–400)
POTASSIUM SERPL-MCNC: 4.4 MMOL/L — SIGNIFICANT CHANGE UP (ref 3.5–5)
POTASSIUM SERPL-SCNC: 4.4 MMOL/L — SIGNIFICANT CHANGE UP (ref 3.5–5)
PROPOXYPHENE QUALITATIVE URINE RESULT: NEGATIVE — SIGNIFICANT CHANGE UP
PROT SERPL-MCNC: 7.4 G/DL — SIGNIFICANT CHANGE UP (ref 6–8)
PROT UR-MCNC: NEGATIVE MG/DL — SIGNIFICANT CHANGE UP
RBC # BLD: 5.51 M/UL — SIGNIFICANT CHANGE UP (ref 4.7–6.1)
RBC # FLD: 12.1 % — SIGNIFICANT CHANGE UP (ref 11.5–14.5)
SALICYLATES SERPL-MCNC: <0.3 MG/DL — LOW (ref 4–30)
SODIUM SERPL-SCNC: 135 MMOL/L — SIGNIFICANT CHANGE UP (ref 135–146)
SP GR SPEC: <=1.005 — SIGNIFICANT CHANGE UP (ref 1.01–1.03)
UROBILINOGEN FLD QL: 0.2 MG/DL — SIGNIFICANT CHANGE UP (ref 0.2–0.2)
WBC # BLD: 11.2 K/UL — HIGH (ref 4.8–10.8)
WBC # FLD AUTO: 11.2 K/UL — HIGH (ref 4.8–10.8)

## 2018-05-23 RX ORDER — HYDROXYZINE HCL 10 MG
50 TABLET ORAL EVERY 6 HOURS
Qty: 0 | Refills: 0 | Status: DISCONTINUED | OUTPATIENT
Start: 2018-05-23 | End: 2018-06-05

## 2018-05-23 RX ORDER — MIRTAZAPINE 45 MG/1
30 TABLET, ORALLY DISINTEGRATING ORAL AT BEDTIME
Qty: 0 | Refills: 0 | Status: DISCONTINUED | OUTPATIENT
Start: 2018-05-23 | End: 2018-06-05

## 2018-05-23 RX ADMIN — MIRTAZAPINE 30 MILLIGRAM(S): 45 TABLET, ORALLY DISINTEGRATING ORAL at 20:10
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
## 2018-05-23 NOTE — ED PROVIDER NOTE - ATTENDING CONTRIBUTION TO CARE
I personally evaluated the patient. I reviewed the Physician Assistant’s note (as assigned above), and agree with the findings and plan except as documented in my note. I personally evaluated the patient. I reviewed the Physician Assistant’s note (as assigned above), and agree with the findings and plan

## 2018-05-23 NOTE — ED BEHAVIORAL HEALTH ASSESSMENT NOTE - DESCRIPTION
Pt was seen and evaluated by ER MD and upon medical clearance.  Pt was seen and evaluated with ex-girlfriend present Sirena Guajardo tel. no. [phone], who provided collateral information and confirmed history gathered above.  Pt currently admits to severe depression, irritability and poor concentration as well as poor memory. He feels hopeless and helpless but denies any suicidal or homicidal ideation at present.  Pt unable to care for himself and requires admission to psychiatry inpatient unit for further evaluation and management.  Pt consented to this admission on the advice of his ex-girlfriend who brought him to ER. History chronic back pain secondary to back injury. Pt is single never  with no children currently homeless.

## 2018-05-23 NOTE — ED PROVIDER NOTE - OBJECTIVE STATEMENT
47 year old male past medical history of PTSD, depression and anxiety substance abuse here today for increasing depression and and anxiety and states that he wants to admitted to psych. patient denies SI/HI. denies chest pain, shortness of breath, abdominal pain, no nausea, vomiting, diarrhea, no fever/chills

## 2018-05-23 NOTE — CONSULT NOTE ADULT - SUBJECTIVE AND OBJECTIVE BOX
KELLY ANDRADE  47y  Male      Patient is a 47y old  Male who presents with a chief complaint of suicidal (23 May 2018 06:44)        PAST MEDICAL/SURGICAL HISTORY  PAST MEDICAL & SURGICAL HISTORY:  Depression  Benzodiazepine abuse  Alcohol abuse  ? hx htn has had catapress prn in past  denies cp sob  No significant past surgical history      REVIEW OF SYSTEMS:  CONSTITUTIONAL: No fever, weight loss, or fatigue  EYES: No eye pain, visual disturbances, or discharge  ENMT:  No difficulty hearing, tinnitus, vertigo; No sinus or throat pain  NECK: No pain or stiffness    RESPIRATORY: No cough, wheezing, chills or hemoptysis; No shortness of breath  CARDIOVASCULAR: No chest pain, palpitations, dizziness, or leg swelling  GASTROINTESTINAL: No abdominal or epigastric pain. No nausea, vomiting, or hematemesis; No diarrhea or constipation. No melena or hematochezia.    Home Medications:  mirtazapine:  (23 May 2018 05:06)  Remeron 30 mg oral tablet: 1 tab(s) orally once a day (at bedtime) (23 May 2018 05:06)    T(C): 36.2 (05-23-18 @ 11:05), Max: 36.8 (05-23-18 @ 01:00)  HR: 88 (05-23-18 @ 11:05) (50 - 91)  BP: 133/75 (05-23-18 @ 11:05) (103/60 - 158/102)  RR: 18 (05-23-18 @ 11:05) (18 - 22)  SpO2: 100% (05-23-18 @ 05:07) (97% - 100%)  Wt(kg): --Vital Signs Last 24 Hrs  T(C): 36.2 (23 May 2018 11:05), Max: 36.8 (23 May 2018 01:00)  T(F): 97.1 (23 May 2018 11:05), Max: 98.2 (23 May 2018 01:00)  HR: 88 (23 May 2018 11:05) (50 - 91)  BP: 133/75 (23 May 2018 11:05) (103/60 - 158/102)  BP(mean): --  RR: 18 (23 May 2018 11:05) (18 - 22)  SpO2: 100% (23 May 2018 05:07) (97% - 100%)    PHYSICAL EXAM:  GENERAL: NAD, well-groomed, well-developed  HEAD:  Atraumatic, Normocephalic  EYES: EOMI, PERRLA, conjunctiva and sclera clear  ENMT: No tonsillar erythema, exudates, or enlargement; Moist mucous membranes, Good dentition, No lesions  NECK: Supple, No JVD, Normal thyroid  NERVOUS SYSTEM:  Alert & Oriented X3, Good concentration; Motor Strength 5/5 B/L upper and lower extremities; DTRs 2+ intact and symmetric  CHEST/LUNG: Clear to percussion bilaterally; No rales, rhonchi, wheezing, or rubs  HEART: Regular rate and rhythm; No murmurs, rubs, or gallops  ABDOMEN: Soft, Nontender, Nondistended; Bowel sounds present  EXTREMITIES:  2+ Peripheral Pulses, No clubbing, cyanosis, or edema  LYMPH: No lymphadenopathy noted  SKIN: No rashes or lesions    Consultant(s) Notes Reviewed:  [x ] YES  [ ] NO  Care Discussed with Consultants/Other Providers [ x] YES  [ ] NO    LABS:  CBC   05-23-18 @ 02:39  Hematcorit 47.3  Hemoglobin 16.0  Mean Cell Hemoglobin 29.0  Platelet Count-Automated 343  RBC Count 5.51  Red Cell Distrib Width 12.1  Wbc Count 11.20      BMP  05-23-18 @ 02:39  Anion Gap. Serum 13  Blood Urea Nitrogen,Serm 17  Calcium, Total Serum 9.6  Carbon Dioxide, Serum 25  Chloride, Serum 97  Creatinine, Serum 1.1  eGFR in  92  eGFR in Non Afican American 80  Gloucose, serum 105  Potassium, Serum 4.4  Sodium, Serum 135                  CMP  05-23-18 @ 02:39  Veda Aminotransferase(ALT/SGPT)18  Albumin, Serum 4.9  Alkaline Phosphatase, Serum 108  Anion Gap, Serum 13  Aspartate Aminotransferase (AST/SGOT)26  Bilirubin Total, Serum 0.3  Blood Urea Nitrogen, Serum 17  Calcium,Total Serum 9.6  Carbon Dioxide, Serum 25  Chloride, Serum 97  Creatinine, Serum 1.1  eGFR if  92  eGFR if Non African American 80  Glucose, Serum 105  Potassium, Serum 4.4  Protein Total, Serum 7.4  Sodium, Serum 135                          PT/INR      Amylase/Lipase            RADIOLOGY & ADDITIONAL TESTS:    Imaging Personally Reviewed:  [ ] YES  [ ] NO

## 2018-05-23 NOTE — H&P ADULT - NSHPPHYSICALEXAM_GEN_ALL_CORE
GENERAL:  48y/o Male NAD, resting comfortably.  HEAD:  Atraumatic, Normocephalic  EYES: EOMI, PERRLA, conjunctiva and sclera clear  NECK: Supple, No JVD, no cervical lymphadenopathy, non-tender  CHEST/LUNG: Clear to auscultation bilaterally; No wheeze, rhonchi, or rales  HEART: Regular rate and rhythm; S1&S2  ABDOMEN: Soft, Nontender, Nondistended x 4 quadrants; Bowel sounds present  EXTREMITIES:   Peripheral Pulses Present, No clubbing, no cyanosis, or no edema, no calf tenderness  PSYCH: AAOx3, cooperative, appropriate  NEUROLOGY: WNL  SKIN: WNL

## 2018-05-23 NOTE — ED BEHAVIORAL HEALTH ASSESSMENT NOTE - AXIS IV
Problem related to social environment/Other psychosocial and environmental problems/Problems with primary support

## 2018-05-23 NOTE — ED BEHAVIORAL HEALTH ASSESSMENT NOTE - RISK ASSESSMENT
Pt currently denies any suicidal or homicidal ideation at present and denies any suicidal or homicidal risk behavior.

## 2018-05-23 NOTE — ED BEHAVIORAL HEALTH ASSESSMENT NOTE - SUMMARY
Pt is a 48 y/o with long history of PTSD and Major Depressive disorder with past psychiatric hospitalizations and with history of Polysubstance use disorder currently sober who presents with recurrences of severe major depressive episodes precipitated and exacerbated by current situational stressors for at least the past 2 weeks  Pt meets criteria for diagnosis of major Depressive Disorder, recurrent severe. Pt unable to care for self and requires voluntary admission to psychiatry inpatient unit for further evaluation and management.
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
## 2018-05-23 NOTE — ED BEHAVIORAL HEALTH ASSESSMENT NOTE - DETAILS
Ejndorsed to nurse-in-charge Case and psychiatric disposition plan discussed with EDMUNDO Soares Non-available

## 2018-05-23 NOTE — H&P ADULT - HISTORY OF PRESENT ILLNESS
· Chief Complaint: The patient is a 47y Male complaining of see chief complaint quote.	  · HPI Objective Statement: 47 year old male past medical history of PTSD, depression and anxiety substance abuse here today for increasing depression and and anxiety and states that he wants to admitted to psych. patient denies SI/HI. denies chest pain, shortness of breath, abdominal pain, no nausea, vomiting, diarrhea, no fever/chills

## 2018-05-23 NOTE — ED BEHAVIORAL HEALTH ASSESSMENT NOTE - OTHER
poor limited Break-up with girlfriend leading to homelessness break-up with last girlfriend leading to homelessness Ex-girlfriend

## 2018-05-23 NOTE — ED BEHAVIORAL HEALTH ASSESSMENT NOTE - DESCRIPTION (FIRST USE, LAST USE, QUANTITY, FREQUENCY, DURATION)
history of alcohol abuse/dependence currently sober more than 2 weeks history of opioid use disorder in remission more than 6 years. history of Benzodiazepine (Xanax) currently sober for more than 2 weeks.

## 2018-05-23 NOTE — ED BEHAVIORAL HEALTH ASSESSMENT NOTE - OTHER PAST PSYCHIATRIC HISTORY (INCLUDE DETAILS REGARDING ONSET, COURSE OF ILLNESS, INPATIENT/OUTPATIENT TREATMENT)
Pt has history of PTSD and recurrent Major Depressive episodes beginning in his early 30's with multiple recurrent psychiatric hospitalization, the last one was more than 6 years ago and pt currently followed-up by private psychiatrist.

## 2018-05-24 DIAGNOSIS — F41.9 ANXIETY DISORDER, UNSPECIFIED: ICD-10-CM

## 2018-05-24 DIAGNOSIS — F13.20 SEDATIVE, HYPNOTIC OR ANXIOLYTIC DEPENDENCE, UNCOMPLICATED: ICD-10-CM

## 2018-05-24 DIAGNOSIS — F32.9 MAJOR DEPRESSIVE DISORDER, SINGLE EPISODE, UNSPECIFIED: ICD-10-CM

## 2018-05-24 DIAGNOSIS — F12.20 CANNABIS DEPENDENCE, UNCOMPLICATED: ICD-10-CM

## 2018-05-24 DIAGNOSIS — F17.210 NICOTINE DEPENDENCE, CIGARETTES, UNCOMPLICATED: ICD-10-CM

## 2018-05-24 DIAGNOSIS — F10.20 ALCOHOL DEPENDENCE, UNCOMPLICATED: ICD-10-CM

## 2018-05-24 DIAGNOSIS — F43.10 POST-TRAUMATIC STRESS DISORDER, UNSPECIFIED: ICD-10-CM

## 2018-05-24 DIAGNOSIS — Z65.3 PROBLEMS RELATED TO OTHER LEGAL CIRCUMSTANCES: ICD-10-CM

## 2018-05-24 DIAGNOSIS — Z51.89 ENCOUNTER FOR OTHER SPECIFIED AFTERCARE: ICD-10-CM

## 2018-05-24 LAB
AMPHET UR-MCNC: NEGATIVE — SIGNIFICANT CHANGE UP
BARBITURATES UR SCN-MCNC: NEGATIVE — SIGNIFICANT CHANGE UP
BENZODIAZ UR-MCNC: NEGATIVE — SIGNIFICANT CHANGE UP
COCAINE METAB.OTHER UR-MCNC: NEGATIVE — SIGNIFICANT CHANGE UP
METHADONE UR-MCNC: NEGATIVE — SIGNIFICANT CHANGE UP
OPIATES UR-MCNC: NEGATIVE — SIGNIFICANT CHANGE UP
PCP SPEC-MCNC: SIGNIFICANT CHANGE UP
PROPOXYPHENE QUALITATIVE URINE RESULT: NEGATIVE — SIGNIFICANT CHANGE UP

## 2018-05-24 RX ADMIN — MIRTAZAPINE 30 MILLIGRAM(S): 45 TABLET, ORALLY DISINTEGRATING ORAL at 21:19

## 2018-05-24 NOTE — PROGRESS NOTE BEHAVIORAL HEALTH - NSBHCHARTREVIEWLAB_PSY_A_CORE FT
16.0   11.20 )-----------( 343      ( 23 May 2018 02:39 )             47.3   05-23    135  |  97<L>  |  17  ----------------------------<  105<H>  4.4   |  25  |  1.1    Ca    9.6      23 May 2018 02:39    TPro  7.4  /  Alb  4.9  /  TBili  0.3  /  DBili  <0.2  /  AST  26  /  ALT  18  /  AlkPhos  108  05-23    Urinalysis Basic - ( 23 May 2018 02:39 )    Color: Yellow / Appearance: Clear / SG: <=1.005 / pH: x  Gluc: x / Ketone: Negative  / Bili: Negative / Urobili: 0.2 mg/dL   Blood: x / Protein: Negative mg/dL / Nitrite: Negative   Leuk Esterase: Negative / RBC: x / WBC x   Sq Epi: x / Non Sq Epi: x / Bacteria: x

## 2018-05-24 NOTE — PROGRESS NOTE BEHAVIORAL HEALTH - SUMMARY
Patient is a 46 y/o with long history of PTSD and Major Depressive disorder with past psychiatric hospitalizations and with history of Polysubstance use disorder who initially presented with depressive symptoms and exacerbated by current situational stressors for at least the past 2 weeks  Patient reports improvement of symptoms and requires assistance with social stressors and housing issues.

## 2018-05-24 NOTE — PROGRESS NOTE BEHAVIORAL HEALTH - NSBHCHARTREVIEWVS_PSY_A_CORE FT
Vital Signs Last 24 Hrs  T(C): 37.6 (24 May 2018 10:48), Max: 37.6 (24 May 2018 10:48)  T(F): 99.7 (24 May 2018 10:48), Max: 99.7 (24 May 2018 10:48)  HR: 83 (24 May 2018 10:48) (83 - 83)  BP: 145/80 (24 May 2018 10:48) (145/80 - 145/80)  RR: 18 (24 May 2018 10:48) (18 - 18)

## 2018-05-25 RX ORDER — NICOTINE POLACRILEX 2 MG
2 GUM BUCCAL EVERY 4 HOURS
Qty: 0 | Refills: 0 | Status: DISCONTINUED | OUTPATIENT
Start: 2018-05-25 | End: 2018-06-05

## 2018-05-25 RX ADMIN — Medication 2 MILLIGRAM(S): at 12:53

## 2018-05-25 RX ADMIN — MIRTAZAPINE 30 MILLIGRAM(S): 45 TABLET, ORALLY DISINTEGRATING ORAL at 20:54

## 2018-05-25 RX ADMIN — Medication 2 MILLIGRAM(S): at 19:42

## 2018-05-26 RX ORDER — TRAZODONE HCL 50 MG
50 TABLET ORAL ONCE
Qty: 0 | Refills: 0 | Status: COMPLETED | OUTPATIENT
Start: 2018-05-26 | End: 2018-05-26

## 2018-05-26 RX ADMIN — MIRTAZAPINE 30 MILLIGRAM(S): 45 TABLET, ORALLY DISINTEGRATING ORAL at 20:39

## 2018-05-26 RX ADMIN — Medication 50 MILLIGRAM(S): at 20:38

## 2018-05-26 RX ADMIN — Medication 2 MILLIGRAM(S): at 10:13

## 2018-05-26 NOTE — PROVIDER CONTACT NOTE (OTHER) - SITUATION
Pt complaining of difficulty sleeping; Pt states they took trazadone in the past for insomnia. MD Sadler aware, awaiting order for intervention

## 2018-05-27 RX ADMIN — Medication 2 MILLIGRAM(S): at 12:14

## 2018-05-27 RX ADMIN — MIRTAZAPINE 30 MILLIGRAM(S): 45 TABLET, ORALLY DISINTEGRATING ORAL at 20:19

## 2018-05-28 RX ADMIN — MIRTAZAPINE 30 MILLIGRAM(S): 45 TABLET, ORALLY DISINTEGRATING ORAL at 20:26

## 2018-05-28 RX ADMIN — Medication 50 MILLIGRAM(S): at 20:26

## 2018-05-29 RX ORDER — DIPHENHYDRAMINE HCL 50 MG
50 CAPSULE ORAL DAILY
Qty: 0 | Refills: 0 | Status: DISCONTINUED | OUTPATIENT
Start: 2018-05-29 | End: 2018-06-05

## 2018-05-29 RX ADMIN — MIRTAZAPINE 30 MILLIGRAM(S): 45 TABLET, ORALLY DISINTEGRATING ORAL at 20:59

## 2018-05-29 RX ADMIN — Medication 50 MILLIGRAM(S): at 15:46

## 2018-05-29 RX ADMIN — Medication 50 MILLIGRAM(S): at 21:01

## 2018-05-30 RX ADMIN — MIRTAZAPINE 30 MILLIGRAM(S): 45 TABLET, ORALLY DISINTEGRATING ORAL at 20:11

## 2018-05-30 RX ADMIN — Medication 50 MILLIGRAM(S): at 18:03

## 2018-05-30 RX ADMIN — Medication 0.1 MILLIGRAM(S): at 15:28

## 2018-05-30 NOTE — CHART NOTE - NSCHARTNOTEFT_GEN_A_CORE
Social Work Note:    This worker met with patient 1:1 this morning.  At times patient has been angry, irritable toward staff on unit rounds.  During interview patient was calm and cooperative with this worker.  Patient informed he is a patient of psychiatrist Dr. Duff.  Mood remains low, depressed.      In the community patient resides with his girlfriend.  He will be returning to this home when discharged.  Community linkages to medical clinic and [facility] discussed.      At this time patient is not psychiatrically stable for discharge.

## 2018-05-31 RX ADMIN — MIRTAZAPINE 30 MILLIGRAM(S): 45 TABLET, ORALLY DISINTEGRATING ORAL at 20:07

## 2018-06-01 RX ADMIN — MIRTAZAPINE 30 MILLIGRAM(S): 45 TABLET, ORALLY DISINTEGRATING ORAL at 20:34

## 2018-06-02 RX ADMIN — MIRTAZAPINE 30 MILLIGRAM(S): 45 TABLET, ORALLY DISINTEGRATING ORAL at 20:17

## 2018-06-03 RX ADMIN — Medication 0.1 MILLIGRAM(S): at 10:32

## 2018-06-03 RX ADMIN — MIRTAZAPINE 30 MILLIGRAM(S): 45 TABLET, ORALLY DISINTEGRATING ORAL at 20:05

## 2018-06-03 RX ADMIN — Medication 50 MILLIGRAM(S): at 10:28

## 2018-06-03 NOTE — PROGRESS NOTE BEHAVIORAL HEALTH - NSBHFUPINTERVALHXFT_PSY_A_CORE
Patient seen and evaluated. Patient is visible on the unit and has been present for meals. Patient reports that he is doing better from when he was admitted. However patient reports that he just "doesn't trust himself on the outside." Patient is unable to elaborate on what he means by this. Patient does report having chronic suicidal thoughts and states he has engaged in activities to hurt himself in the past but is unable to provide examples. Patient also states "its unbelievable that I am still here" but is unable to provide information of the severity of his illness any past suicide attempts etc. Patient does report that he currently does not have any where to live and is not sure where he would go if discharged. In regard to the possibility of rehab patient reports " I have been to detox and rehab and I had an anxiety attack over there, I am not going again." Patient denies any current SI/HI.
Pt reported that he feels much better, denies feeling depressed and Denies any suicidal or homicidal ideation at present and compliant with treatment and responsive to staff's verbal redirection.
pt  seen , discussed with staff , superficially cooperative , redirect able  Pt is visible on the unit. compliant with medications , at times request PRN . No acute event overnight
superficially cooperative , redirectable. Pt is visible on the unit. compliant with medications , at times request PRN . No acute event overnight
pt is seen , discussed with staff , superficially cooperative , redirectable. Pt is visible on the unit. compliant with medications , at times request PRN . No acute event overnight
pt expressing thoughts feelings about having been in [facility] until last year

## 2018-06-04 RX ADMIN — Medication 0.1 MILLIGRAM(S): at 17:51

## 2018-06-04 RX ADMIN — MIRTAZAPINE 30 MILLIGRAM(S): 45 TABLET, ORALLY DISINTEGRATING ORAL at 20:13
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
## 2018-06-04 NOTE — PROGRESS NOTE BEHAVIORAL HEALTH - REMOTE MEMORY
Normal
Impaired
Normal
Impaired
Normal
Normal
Impaired
Impaired
Normal
Impaired
Impaired
Normal
Impaired

## 2018-06-04 NOTE — PROGRESS NOTE BEHAVIORAL HEALTH - THOUGHT CONTENT
Unremarkable
Unremarkable
Hopelessness/Unremarkable
Hopelessness/Unremarkable
Unremarkable
Unremarkable/Hopelessness
Unremarkable
Hopelessness/Unremarkable
Unremarkable
Unremarkable/Hopelessness
Unremarkable/Hopelessness

## 2018-06-04 NOTE — PROGRESS NOTE BEHAVIORAL HEALTH - AXIS III
Chronic back pain secondary to back injury

## 2018-06-04 NOTE — CHART NOTE - NSCHARTNOTEFT_GEN_A_CORE
Social Work Note:    Patient presents as alert, oriented, and able to engage with treatment team.  Mood is neutral.  Affect is flat.  Linden is more visible on the unit today.  He interacts with staff and peers appropriately.  Patient is encouraged to attend and actively participate in groups that are offered on the unit during the day.      No barriers to discharge identified at this time.  Patient's friend Salvatore contacted [phone].  Salvatore informs Linden contacts him frequently and no concerns verbalized regarding patient's return to the community.  Plan is for patient to resume treatment with his psychiatrist Dr. Duff when stable for discharge.      Discharge is anticipated for tomorrow - Tuesday June 5.

## 2018-06-04 NOTE — PROGRESS NOTE BEHAVIORAL HEALTH - AFFECT RANGE
Full
Full
Full/Constricted
Constricted
Constricted/Other
Constricted
Full
Full/Constricted
Other/Constricted
Full
Full
Full/Constricted
Full/Constricted

## 2018-06-04 NOTE — PROGRESS NOTE BEHAVIORAL HEALTH - THOUGHT PROCESS
Linear
Linear/Thought blocking
Linear
Linear
Linear/Other
Linear
Linear
Linear/Thought blocking
Linear

## 2018-06-04 NOTE — PROGRESS NOTE BEHAVIORAL HEALTH - BEHAVIOR
Uncooperative
Uncooperative
Hostile/Uncooperative
Uncooperative
Uncooperative
Hostile/Uncooperative
Uncooperative/Hostile
Hostile/Uncooperative
Cooperative
Hostile/Uncooperative
Hostile/Uncooperative
Uncooperative
Uncooperative

## 2018-06-04 NOTE — PROGRESS NOTE BEHAVIORAL HEALTH - PRIMARY DX
Benzodiazepine abuse
Depression
Alcohol abuse
Alcohol abuse
Depression
Alcohol abuse
Benzodiazepine abuse
PTSD (post-traumatic stress disorder)
Depression

## 2018-06-04 NOTE — PROGRESS NOTE BEHAVIORAL HEALTH - SECONDARY DX2
PTSD (post-traumatic stress disorder)

## 2018-06-04 NOTE — PROGRESS NOTE BEHAVIORAL HEALTH - NSBHATTESTSEENBY_PSY_A_CORE
attending Psychiatrist without NP/Trainee
Attending Psychiatrist supervising NP/Trainee, meeting pt...

## 2018-06-04 NOTE — PROGRESS NOTE BEHAVIORAL HEALTH - AFFECT QUALITY
Euthymic
Depressed/Irritable
Euthymic/Irritable/Other
Irritable/Other/Euthymic
Euthymic/Irritable
Irritable/Euthymic/Other
Euthymic
Euthymic
Euthymic/Other/Irritable
Euthymic
Euthymic
Irritable/Euthymic/Other
Irritable/Other/Euthymic

## 2018-06-04 NOTE — PROGRESS NOTE BEHAVIORAL HEALTH - MUSCLE TONE / STRENGTH
Normal muscle tone/strength
Other
Normal muscle tone/strength

## 2018-06-04 NOTE — PROGRESS NOTE BEHAVIORAL HEALTH - NSBHPTASSESSDT_PSY_A_CORE
02-Jun-2018 09:34
03-Jun-2018 23:43
24-May-2018 11:57
25-May-2018 10:34
26-May-2018 18:41
27-May-2018 19:18
28-May-2018 10:43
29-May-2018 13:35
30-May-2018 12:04
31-May-2018 11:04
23-May-2018 09:38
01-Jun-2018 11:16
04-Jun-2018 15:51

## 2018-06-04 NOTE — PROGRESS NOTE BEHAVIORAL HEALTH - NS ED BHA MSE GENERAL APPEARANCE
Well developed

## 2018-06-04 NOTE — PROGRESS NOTE BEHAVIORAL HEALTH - ABNORMAL MOVEMENTS
No abnormal movements

## 2018-06-04 NOTE — PROGRESS NOTE BEHAVIORAL HEALTH - NS ED BHA MED ROS MUSCULOSKELETAL
No complaints
Yes
No complaints

## 2018-06-04 NOTE — PROGRESS NOTE BEHAVIORAL HEALTH - NSBHLEGALSTATUS_PSY_A_CORE
9.13 (Voluntary)
9.13 (Voluntary)
9.39 (Emergency)
9.13 (Voluntary)

## 2018-06-04 NOTE — PROGRESS NOTE BEHAVIORAL HEALTH - BODY HABITUS
Well nourished

## 2018-06-04 NOTE — PROGRESS NOTE BEHAVIORAL HEALTH - MOOD
Angry/Irritable
Depressed
Irritable
Normal
Other/Angry
Irritable/Angry
Angry/Irritable
Depressed
Normal
Angry/Irritable
Irritable/Angry
Normal
Normal

## 2018-06-04 NOTE — PROGRESS NOTE ADULT - PROBLEM SELECTOR PLAN 1
continue present treatment as per psych plan as reviewed  Medically stable with no new changes in treatment  will continue to monitor medical status while being treated on psych    b p stable

## 2018-06-04 NOTE — PROGRESS NOTE ADULT - SUBJECTIVE AND OBJECTIVE BOX
pt stable alert in NAD  no new complaints    DEPRESSION, PTSD  ^DEPRESSION, PTSD  No pertinent family history in first degree relatives  Handoff  MEWS Score  Depression  Opioid abuse  Depression  HTN (hypertension)  PTSD (post-traumatic stress disorder)  Benzodiazepine abuse  Alcohol abuse  Depression  Alcohol abuse  Benzodiazepine abuse  Depression  PTSD (post-traumatic stress disorder)  Severe episode of recurrent major depressive disorder, without psychotic features  No significant past surgical history  MENTAL HEALTH EVAL  5  PTSD (post-traumatic stress disorder)    HEALTH ISSUES - PROBLEM Dx:  Alcohol abuse: Alcohol abuse  Benzodiazepine abuse: Benzodiazepine abuse  Depression: Depression  PTSD (post-traumatic stress disorder)  Severe episode of recurrent major depressive disorder, without psychotic features        PAST MEDICAL & SURGICAL HISTORY:  Depression  Benzodiazepine abuse  Alcohol abuse  No significant past surgical history    NSAIDs (Swelling)      FAMILY HISTORY:  No pertinent family history in first degree relatives      aluminum hydroxide/magnesium hydroxide/simethicone Suspension 30 milliLiter(s) Oral every 4 hours PRN  cloNIDine 0.1 milliGRAM(s) Oral every 8 hours PRN  diphenhydrAMINE   Capsule 50 milliGRAM(s) Oral daily PRN  hydrOXYzine hydrochloride 50 milliGRAM(s) Oral every 6 hours PRN  mirtazapine 30 milliGRAM(s) Oral at bedtime  nicotine  Polacrilex Gum 2 milliGRAM(s) Oral every 4 hours PRN      T(C): 36.6 (06-04-18 @ 13:12), Max: 36.7 (06-03-18 @ 17:57)  HR: 73 (06-04-18 @ 13:12) (73 - 85)  BP: 114/91 (06-04-18 @ 13:12) (114/91 - 124/78)  RR: 18 (06-04-18 @ 13:12) (16 - 18)  SpO2: --    PE;  general: stabel  kary cute changes    Lungs:    Heart:    EXT:    Neuro:                          CAPILLARY BLOOD GLUCOSE

## 2018-06-04 NOTE — PROGRESS NOTE BEHAVIORAL HEALTH - NSBHADMITIPOBSFT_PSY_A_CORE
-
-
clinically indicated.
-no imminent danger to self/others
no imminent danger to self or others
-
-no imminent danger to self/others
-no imminent danger to self/others
no imminent danger to self/others
no imminent danger to self/others
safety

## 2018-06-04 NOTE — PROGRESS NOTE BEHAVIORAL HEALTH - SECONDARY DX1
Alcohol abuse
Benzodiazepine abuse
Alcohol abuse
Benzodiazepine abuse
Benzodiazepine abuse
Alcohol abuse

## 2018-06-04 NOTE — PROGRESS NOTE BEHAVIORAL HEALTH - NSBHCONSORIP_PSY_A_CORE
Consult...
Inpatient Admission...
Inpatient Admission...
Consult...
Inpatient Admission...

## 2018-06-05 VITALS
HEART RATE: 91 BPM | RESPIRATION RATE: 20 BRPM | DIASTOLIC BLOOD PRESSURE: 93 MMHG | SYSTOLIC BLOOD PRESSURE: 135 MMHG | TEMPERATURE: 98 F

## 2018-06-05 RX ORDER — MIRTAZAPINE 45 MG/1
0 TABLET, ORALLY DISINTEGRATING ORAL
Qty: 0 | Refills: 0 | COMMUNITY

## 2018-06-05 RX ORDER — MIRTAZAPINE 45 MG/1
1 TABLET, ORALLY DISINTEGRATING ORAL
Qty: 0 | Refills: 0 | COMMUNITY

## 2018-06-05 RX ORDER — MIRTAZAPINE 45 MG/1
1 TABLET, ORALLY DISINTEGRATING ORAL
Qty: 0 | Refills: 0 | COMMUNITY
Start: 2018-06-05

## 2018-06-05 NOTE — DISCHARGE NOTE BEHAVIORAL HEALTH - PROVIDER TOKENS
FREE:[LAST:[joselito],FIRST:[katalina],PHONE:[phone]],FAX:[(   [phone]    -],ADDRESS:[[address] [place] [id number]]]

## 2018-06-05 NOTE — DISCHARGE NOTE BEHAVIORAL HEALTH - MEDICATION SUMMARY - MEDICATIONS TO TAKE
I will START or STAY ON the medications listed below when I get home from the hospital:    mirtazapine 30 mg oral tablet  -- 1 tab(s) by mouth once a day (at bedtime)  -- Indication: For PTSD (post-traumatic stress disorder)
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
## 2018-06-05 NOTE — CHART NOTE - NSCHARTNOTEFT_GEN_A_CORE
Social Work Note:    Patient is for discharge today.  He is alert and oriented x3.  Mood improved. Anxiety decreased.  Insight and judgment have improved.  Suicidal / homicidal ideation denied.

## 2018-06-05 NOTE — DISCHARGE NOTE BEHAVIORAL HEALTH - CARE PROVIDER_API CALL
katalina yee  [address]  [facility] [address]  Phone: [phone]  Fax: (   )    -

## 2018-06-07 DIAGNOSIS — F32.9 MAJOR DEPRESSIVE DISORDER, SINGLE EPISODE, UNSPECIFIED: ICD-10-CM

## 2018-06-07 DIAGNOSIS — Z87.828 PERSONAL HISTORY OF OTHER (HEALED) PHYSICAL INJURY AND TRAUMA: ICD-10-CM

## 2018-06-07 DIAGNOSIS — Z59.0 HOMELESSNESS: ICD-10-CM

## 2018-06-07 DIAGNOSIS — M54.9 DORSALGIA, UNSPECIFIED: ICD-10-CM

## 2018-06-07 DIAGNOSIS — F10.10 ALCOHOL ABUSE, UNCOMPLICATED: ICD-10-CM

## 2018-06-07 DIAGNOSIS — G89.29 OTHER CHRONIC PAIN: ICD-10-CM

## 2018-06-07 DIAGNOSIS — I10 ESSENTIAL (PRIMARY) HYPERTENSION: ICD-10-CM

## 2018-06-07 DIAGNOSIS — F11.21 OPIOID DEPENDENCE, IN REMISSION: ICD-10-CM

## 2018-06-07 DIAGNOSIS — F43.10 POST-TRAUMATIC STRESS DISORDER, UNSPECIFIED: ICD-10-CM

## 2018-06-07 DIAGNOSIS — F41.0 PANIC DISORDER [EPISODIC PAROXYSMAL ANXIETY]: ICD-10-CM

## 2018-06-07 DIAGNOSIS — F13.10 SEDATIVE, HYPNOTIC OR ANXIOLYTIC ABUSE, UNCOMPLICATED: ICD-10-CM

## 2018-06-07 DIAGNOSIS — F33.2 MAJOR DEPRESSIVE DISORDER, RECURRENT SEVERE WITHOUT PSYCHOTIC FEATURES: ICD-10-CM

## 2018-06-07 DIAGNOSIS — F17.210 NICOTINE DEPENDENCE, CIGARETTES, UNCOMPLICATED: ICD-10-CM

## 2018-06-07 SDOH — ECONOMIC STABILITY - HOUSING INSECURITY: HOMELESSNESS: Z59.0

## 2018-07-31 PROBLEM — F10.10 ALCOHOL ABUSE, UNCOMPLICATED: Chronic | Status: ACTIVE | Noted: 2018-05-05

## 2018-07-31 PROBLEM — F13.10 SEDATIVE, HYPNOTIC OR ANXIOLYTIC ABUSE, UNCOMPLICATED: Chronic | Status: ACTIVE | Noted: 2018-05-05

## 2018-07-31 PROBLEM — F32.9 MAJOR DEPRESSIVE DISORDER, SINGLE EPISODE, UNSPECIFIED: Chronic | Status: ACTIVE | Noted: 2018-05-17

## 2018-08-21 ENCOUNTER — OUTPATIENT (OUTPATIENT)
Dept: OUTPATIENT SERVICES | Facility: HOSPITAL | Age: 48
LOS: 1 days | Discharge: HOME | End: 2018-08-21

## 2018-08-21 DIAGNOSIS — F11.20 OPIOID DEPENDENCE, UNCOMPLICATED: ICD-10-CM

## 2018-10-09 ENCOUNTER — OUTPATIENT (OUTPATIENT)
Dept: OUTPATIENT SERVICES | Facility: HOSPITAL | Age: 48
LOS: 1 days | Discharge: HOME | End: 2018-10-09

## 2018-10-09 DIAGNOSIS — F11.20 OPIOID DEPENDENCE, UNCOMPLICATED: ICD-10-CM

## 2019-07-19 NOTE — ED ADULT NURSE NOTE - PAIN RATING/NUMBER SCALE (0-10): REST
PATIENT REFUSED LUNCH TRAY. STATING "I'M REALLY TIRED." AT THIS TIME NURSE
GAVE PATIENT A SANDWICH WITH FRUIT AND A DIET COKE. 0

## 2019-10-04 NOTE — PATIENT PROFILE BEHAVIORAL HEALTH - NSBHTHTPROCESS_PSY_A_CORE
Subjective:   No complaint    Objective:     Patient Vitals for the past 8 hrs:   BP Temp Pulse Resp SpO2 Weight   10/04/19 0628 107/68  80 18 100 %    10/04/19 0559 100/58  80 17 99 %    10/04/19 0528 112/68  80 17 97 %    10/04/19 0459 104/60  79 9 (!) 85 %    10/04/19 0455 110/61  80 17 (!) 87 %    10/04/19 0418      208 lb 1.8 oz (94.4 kg)   10/04/19 0359 122/68  80 (!) 33 99 %    10/04/19 0259 102/67 98.2 °F (36.8 °C) 79 15 97 %    10/04/19 0158 103/68  80 13 98 %    10/04/19 0058 100/66  81 28 98 %    10/03/19 2358 96/52  79 9 97 %      Temp (24hrs), Av.5 °F (36.9 °C), Min:98.1 °F (36.7 °C), Max:99 °F (37.2 °C)        Heart:  regular rate and rhythm, S1, S2 normal, no murmur, click, rub or gallop  Lung:  clear to auscultation bilaterally   Incisions: Clean, dry, and intact    Labs:  Recent Results (from the past 24 hour(s))   GLUCOSE, POC    Collection Time: 10/03/19  5:20 PM   Result Value Ref Range    Glucose (POC) 177 (H) 65 - 100 mg/dL   PLEASE READ & DOCUMENT PPD TEST IN 48 HRS    Collection Time: 10/03/19  9:21 PM   Result Value Ref Range    PPD Negative Negative    mm Induration 0 0 - 5 mm   GLUCOSE, POC    Collection Time: 10/03/19 10:35 PM   Result Value Ref Range    Glucose (POC) 139 (H) 65 - 500 mg/dL   METABOLIC PANEL, BASIC    Collection Time: 10/04/19  4:16 AM   Result Value Ref Range    Sodium 143 136 - 145 mmol/L    Potassium 4.6 3.5 - 5.1 mmol/L    Chloride 114 (H) 98 - 107 mmol/L    CO2 23 21 - 32 mmol/L    Anion gap 6 (L) 7 - 16 mmol/L    Glucose 140 (H) 65 - 100 mg/dL    BUN 31 (H) 6 - 23 MG/DL    Creatinine 1.43 (H) 0.6 - 1.0 MG/DL    GFR est AA 48 (L) >60 ml/min/1.73m2    GFR est non-AA 40 (L) >60 ml/min/1.73m2    Calcium 7.9 (L) 8.3 - 10.4 MG/DL   MAGNESIUM    Collection Time: 10/04/19  4:16 AM   Result Value Ref Range    Magnesium 2.7 (H) 1.8 - 2.4 mg/dL   CBC W/O DIFF    Collection Time: 10/04/19  4:16 AM   Result Value Ref Range    WBC 12.2 (H) 4.3 - 11.1 K/uL    RBC 3.73 (L) 4.05 - 5.2 M/uL    HGB 10.7 (L) 11.7 - 15.4 g/dL    HCT 34.2 (L) 35.8 - 46.3 %    MCV 91.7 79.6 - 97.8 FL    MCH 28.7 26.1 - 32.9 PG    MCHC 31.3 (L) 31.4 - 35.0 g/dL    RDW 14.7 (H) 11.9 - 14.6 %    PLATELET 122 (L) 033 - 450 K/uL    MPV 12.6 (H) 9.4 - 12.3 FL    ABSOLUTE NRBC 0.00 0.0 - 0.2 K/uL       Assessment:     Principal Problem:    S/P CABG x 3 (10/2/2019)    Active Problems:    CAD (coronary artery disease) (9/26/2019)      UTI (urinary tract infection) (9/30/2019)      Mitral valve regurgitation (10/1/2019)      Hypoxia (10/1/2019)      S/P MVR (mitral valve repair) (10/2/2019)      Hypocalcemia (10/2/2019)      Suspected sleep apnea (10/2/2019)      Thrombocytopenia ([address] (14/4/1866)      Systolic CHF, acute on chronic ([address] (10/3/2019)        Plan/Recommendations/Medical Decision Making:     Stable, ambulate, protocol    See orders goal directed

## 2019-10-14 NOTE — ED BEHAVIORAL HEALTH ASSESSMENT NOTE - MOOD
Patient is doing well post-operatively. The importance of post-op drop compliance was emphasized. Drop schedule reviewed with patient. Patient to call if any visual changes or concerns. Depressed

## 2020-02-21 NOTE — ED PROVIDER NOTE - PSYCHIATRIC MOOD
"Anesthesia Transfer of Care Note    Patient: Yaritza Lopez    Procedure(s) Performed: Procedure(s) (LRB):  ORIF, FRACTURE, METATARSAL BONE  Beverly 28 screws and baby gorilla (Left)    Patient location: PACU    Anesthesia Type: general    Transport from OR: Transported from OR on room air with adequate spontaneous ventilation    Post pain: adequate analgesia    Post assessment: no apparent anesthetic complications and tolerated procedure well    Post vital signs: stable    Level of consciousness: awake and responds to stimulation    Nausea/Vomiting: no nausea/vomiting    Complications: none    Transfer of care protocol was followed      Last vitals:   Visit Vitals  /82 (BP Location: Left arm, Patient Position: Lying)   Pulse 71   Temp 36.8 °C (98.3 °F) (Oral)   Resp 17   Ht 5' 4" (1.626 m)   Wt 49.9 kg (110 lb)   LMP 02/21/2020 (Exact Date)   SpO2 98%   Breastfeeding? No   BMI 18.88 kg/m²     " DEPRESSED

## 2020-03-07 NOTE — ED PROVIDER NOTE - PMH
Unknown Alcohol abuse    Benzodiazepine abuse    Depression    HTN (hypertension)    Opioid abuse    PTSD (post-traumatic stress disorder) Alcohol abuse    Benzodiazepine abuse

## 2020-11-11 NOTE — H&P ADULT - NSHPLABSRESULTS_GEN_ALL_CORE
What Type Of Note Output Would You Prefer (Optional)?: Standard Output
How Severe Is Your Skin Lesion?: mild
Has Your Skin Lesion Been Treated?: not been treated
Is This A New Presentation, Or A Follow-Up?: Skin Lesion
16.0   11.20 )-----------( 343      ( 23 May 2018 02:39 )             47.3       05-23    135  |  97<L>  |  17  ----------------------------<  105<H>  4.4   |  25  |  1.1    Ca    9.6      23 May 2018 02:39    TPro  7.4  /  Alb  4.9  /  TBili  0.3  /  DBili  <0.2  /  AST  26  /  ALT  18  /  AlkPhos  108  05-23              Urinalysis Basic - ( 23 May 2018 02:39 )    Color: Yellow / Appearance: Clear / SG: <=1.005 / pH: x  Gluc: x / Ketone: Negative  / Bili: Negative / Urobili: 0.2 mg/dL   Blood: x / Protein: Negative mg/dL / Nitrite: Negative   Leuk Esterase: Negative / RBC: x / WBC x   Sq Epi: x / Non Sq Epi: x / Bacteria: x            Lactate Trend            CAPILLARY BLOOD GLUCOSE

## 2021-04-29 NOTE — ED ADULT TRIAGE NOTE - TEMPERATURE IN CELSIUS (DEGREES C)
36.3 Complex Repair And O-T Advancement Flap Text: The defect edges were debeveled with a #15 scalpel blade.  The primary defect was closed partially with a complex linear closure.  Given the location of the remaining defect, shape of the defect and the proximity to free margins an O-T advancement flap was deemed most appropriate for complete closure of the defect.  Using a sterile surgical marker, an appropriate advancement flap was drawn incorporating the defect and placing the expected incisions within the relaxed skin tension lines where possible.    The area thus outlined was incised deep to adipose tissue with a #15 scalpel blade.  The skin margins were undermined to an appropriate distance in all directions utilizing iris scissors.

## 2022-09-23 NOTE — DISCHARGE NOTE BEHAVIORAL HEALTH - NSTOBACCOMEDDSC_GEN_A_CS
To get better and follow your care plan as instructed.
patient to continue on over the counter cessation medication

## 2022-11-16 ENCOUNTER — OUTPATIENT (OUTPATIENT)
Dept: OUTPATIENT SERVICES | Facility: HOSPITAL | Age: 52
LOS: 1 days | Discharge: HOME | End: 2022-11-16

## 2022-11-16 DIAGNOSIS — Z00.8 ENCOUNTER FOR OTHER GENERAL EXAMINATION: ICD-10-CM

## 2022-11-16 LAB
A1C WITH ESTIMATED AVERAGE GLUCOSE RESULT: 5.7 % — HIGH (ref 4–5.6)
ALBUMIN SERPL ELPH-MCNC: 4.5 G/DL — SIGNIFICANT CHANGE UP (ref 3.5–5.2)
ALP SERPL-CCNC: 133 U/L — HIGH (ref 30–115)
ALT FLD-CCNC: 12 U/L — SIGNIFICANT CHANGE UP (ref 0–41)
ANION GAP SERPL CALC-SCNC: 14 MMOL/L — SIGNIFICANT CHANGE UP (ref 7–14)
APPEARANCE UR: CLEAR — SIGNIFICANT CHANGE UP
AST SERPL-CCNC: 22 U/L — SIGNIFICANT CHANGE UP (ref 0–41)
BILIRUB SERPL-MCNC: 0.3 MG/DL — SIGNIFICANT CHANGE UP (ref 0.2–1.2)
BILIRUB UR-MCNC: NEGATIVE — SIGNIFICANT CHANGE UP
BUN SERPL-MCNC: 18 MG/DL — SIGNIFICANT CHANGE UP (ref 10–20)
CALCIUM SERPL-MCNC: 9.3 MG/DL — SIGNIFICANT CHANGE UP (ref 8.4–10.5)
CHLORIDE SERPL-SCNC: 99 MMOL/L — SIGNIFICANT CHANGE UP (ref 98–110)
CHOLEST SERPL-MCNC: 226 MG/DL — HIGH
CO2 SERPL-SCNC: 24 MMOL/L — SIGNIFICANT CHANGE UP (ref 17–32)
COLOR SPEC: YELLOW — SIGNIFICANT CHANGE UP
CREAT SERPL-MCNC: 1 MG/DL — SIGNIFICANT CHANGE UP (ref 0.7–1.5)
DIFF PNL FLD: NEGATIVE — SIGNIFICANT CHANGE UP
EGFR: 91 ML/MIN/1.73M2 — SIGNIFICANT CHANGE UP
ESTIMATED AVERAGE GLUCOSE: 117 MG/DL — HIGH (ref 68–114)
GLUCOSE SERPL-MCNC: 115 MG/DL — HIGH (ref 70–99)
GLUCOSE UR QL: NEGATIVE MG/DL — SIGNIFICANT CHANGE UP
HCT VFR BLD CALC: 43.4 % — SIGNIFICANT CHANGE UP (ref 42–52)
HDLC SERPL-MCNC: 69 MG/DL — SIGNIFICANT CHANGE UP
HGB BLD-MCNC: 14.6 G/DL — SIGNIFICANT CHANGE UP (ref 14–18)
KETONES UR-MCNC: NEGATIVE — SIGNIFICANT CHANGE UP
LEUKOCYTE ESTERASE UR-ACNC: NEGATIVE — SIGNIFICANT CHANGE UP
LIPID PNL WITH DIRECT LDL SERPL: 145 MG/DL — HIGH
MAGNESIUM SERPL-MCNC: 2 MG/DL — SIGNIFICANT CHANGE UP (ref 1.8–2.4)
MCHC RBC-ENTMCNC: 28.3 PG — SIGNIFICANT CHANGE UP (ref 27–31)
MCHC RBC-ENTMCNC: 33.6 G/DL — SIGNIFICANT CHANGE UP (ref 32–37)
MCV RBC AUTO: 84.1 FL — SIGNIFICANT CHANGE UP (ref 80–94)
NITRITE UR-MCNC: NEGATIVE — SIGNIFICANT CHANGE UP
NON HDL CHOLESTEROL: 157 MG/DL — HIGH
NRBC # BLD: 0 /100 WBCS — SIGNIFICANT CHANGE UP (ref 0–0)
PH UR: 6 — SIGNIFICANT CHANGE UP (ref 5–8)
PLATELET # BLD AUTO: 278 K/UL — SIGNIFICANT CHANGE UP (ref 130–400)
POTASSIUM SERPL-MCNC: 4.1 MMOL/L — SIGNIFICANT CHANGE UP (ref 3.5–5)
POTASSIUM SERPL-SCNC: 4.1 MMOL/L — SIGNIFICANT CHANGE UP (ref 3.5–5)
PROT SERPL-MCNC: 7 G/DL — SIGNIFICANT CHANGE UP (ref 6–8)
PROT UR-MCNC: NEGATIVE MG/DL — SIGNIFICANT CHANGE UP
RBC # BLD: 5.16 M/UL — SIGNIFICANT CHANGE UP (ref 4.7–6.1)
RBC # FLD: 12.4 % — SIGNIFICANT CHANGE UP (ref 11.5–14.5)
SODIUM SERPL-SCNC: 137 MMOL/L — SIGNIFICANT CHANGE UP (ref 135–146)
SP GR SPEC: 1.02 — SIGNIFICANT CHANGE UP (ref 1.01–1.03)
TRIGL SERPL-MCNC: 60 MG/DL — SIGNIFICANT CHANGE UP
UROBILINOGEN FLD QL: 0.2 MG/DL — SIGNIFICANT CHANGE UP
WBC # BLD: 8.17 K/UL — SIGNIFICANT CHANGE UP (ref 4.8–10.8)
WBC # FLD AUTO: 8.17 K/UL — SIGNIFICANT CHANGE UP (ref 4.8–10.8)

## 2022-11-17 LAB
HAV IGM SER-ACNC: SIGNIFICANT CHANGE UP
HBV CORE IGM SER-ACNC: SIGNIFICANT CHANGE UP
HBV SURFACE AG SER-ACNC: SIGNIFICANT CHANGE UP
HCV AB S/CO SERPL IA: 0.2 S/CO — SIGNIFICANT CHANGE UP (ref 0–0.99)
HCV AB SERPL-IMP: SIGNIFICANT CHANGE UP
T PALLIDUM AB TITR SER: NEGATIVE — SIGNIFICANT CHANGE UP

## 2022-11-19 LAB
GAMMA INTERFERON BACKGROUND BLD IA-ACNC: 0.02 IU/ML — SIGNIFICANT CHANGE UP
M TB IFN-G BLD-IMP: NEGATIVE — SIGNIFICANT CHANGE UP
M TB IFN-G CD4+ BCKGRND COR BLD-ACNC: 0 IU/ML — SIGNIFICANT CHANGE UP
M TB IFN-G CD4+CD8+ BCKGRND COR BLD-ACNC: 0.01 IU/ML — SIGNIFICANT CHANGE UP
QUANT TB PLUS MITOGEN MINUS NIL: 0.63 IU/ML — SIGNIFICANT CHANGE UP

## 2022-12-15 ENCOUNTER — OUTPATIENT (OUTPATIENT)
Dept: OUTPATIENT SERVICES | Facility: HOSPITAL | Age: 52
LOS: 1 days | Discharge: HOME | End: 2022-12-15

## 2022-12-15 DIAGNOSIS — I49.9 CARDIAC ARRHYTHMIA, UNSPECIFIED: ICD-10-CM

## 2022-12-15 PROCEDURE — 93010 ELECTROCARDIOGRAM REPORT: CPT

## 2023-01-03 ENCOUNTER — EMERGENCY (EMERGENCY)
Facility: HOSPITAL | Age: 53
LOS: 0 days | Discharge: HOME | End: 2023-01-03
Attending: EMERGENCY MEDICINE | Admitting: EMERGENCY MEDICINE
Payer: MEDICARE

## 2023-01-03 VITALS
TEMPERATURE: 97 F | OXYGEN SATURATION: 100 % | DIASTOLIC BLOOD PRESSURE: 74 MMHG | WEIGHT: 205.03 LBS | RESPIRATION RATE: 18 BRPM | HEART RATE: 85 BPM | SYSTOLIC BLOOD PRESSURE: 141 MMHG

## 2023-01-03 DIAGNOSIS — J34.89 OTHER SPECIFIED DISORDERS OF NOSE AND NASAL SINUSES: ICD-10-CM

## 2023-01-03 DIAGNOSIS — F32.A DEPRESSION, UNSPECIFIED: ICD-10-CM

## 2023-01-03 DIAGNOSIS — Z79.891 LONG TERM (CURRENT) USE OF OPIATE ANALGESIC: ICD-10-CM

## 2023-01-03 DIAGNOSIS — R09.81 NASAL CONGESTION: ICD-10-CM

## 2023-01-03 DIAGNOSIS — R11.0 NAUSEA: ICD-10-CM

## 2023-01-03 DIAGNOSIS — R05.1 ACUTE COUGH: ICD-10-CM

## 2023-01-03 DIAGNOSIS — F17.290 NICOTINE DEPENDENCE, OTHER TOBACCO PRODUCT, UNCOMPLICATED: ICD-10-CM

## 2023-01-03 DIAGNOSIS — Z88.6 ALLERGY STATUS TO ANALGESIC AGENT: ICD-10-CM

## 2023-01-03 DIAGNOSIS — Z86.59 PERSONAL HISTORY OF OTHER MENTAL AND BEHAVIORAL DISORDERS: ICD-10-CM

## 2023-01-03 PROCEDURE — 71046 X-RAY EXAM CHEST 2 VIEWS: CPT | Mod: 26

## 2023-01-03 PROCEDURE — 99284 EMERGENCY DEPT VISIT MOD MDM: CPT | Mod: GC

## 2023-01-03 NOTE — ED PROVIDER NOTE - PATIENT PORTAL LINK FT
You can access the FollowMyHealth Patient Portal offered by [facility] by registering at the following website: [email or web address]. By joining [facility]’s FollowMyHealth portal, you will also be able to view your health information using other applications (apps) compatible with our system.

## 2023-01-03 NOTE — ED PROVIDER NOTE - NSFOLLOWUPCLINICS_GEN_ALL_ED_FT
[facility]  Medicine  [address], [place]   Phone: [phone]  Fax:   Follow Up Time: 1-3 Days

## 2023-01-03 NOTE — ED PROVIDER NOTE - ADDITIONAL NOTES AND INSTRUCTIONS:
Please allow pt to have limited physical activity for next week until 1/11.  Advance activity as tolerated.

## 2023-01-03 NOTE — ED PROVIDER NOTE - NSFOLLOWUPINSTRUCTIONS_ED_ALL_ED_FT
Cough    Coughing is a reflex that clears your throat and your airways. Coughing helps to heal and protect your lungs. It is normal to cough occasionally, but a cough that happens with other symptoms or lasts a long time may be a sign of a condition that needs treatment. Coughing may be caused by infections, asthma or COPD, smoking, postnasal drip, gastroesophageal reflux, as well as other medical conditions. Take medicines only as instructed by your health care provider. Avoid anything that causes you to cough at work or at home including smoking.    SEEK IMMEDIATE MEDICAL CARE IF YOU HAVE THE FOLLOWING SYMPTOMS: coughing up blood, shortness of breath, rapid heart rate, chest pain, unexplained weight loss or night sweats.    Nasal congestion or "stuffy nose" occurs when nasal and adjacent tissues and blood vessels become swollen with excess fluid, causing a "stuffy" plugged feeling. Nasal congestion may or may not include a nasal discharge or "runny nose."    Nasal congestion usually is just an annoyance for older children and adults. But nasal congestion can be serious for children whose sleep is disturbed by their nasal congestion, or for infants, who might have a hard time feeding as a result.    Nasal congestion can be caused by anything that irritates or inflames the nasal tissues. Infections — such as colds, flu or sinusitis — and allergies are frequent causes of nasal congestion and runny nose. Sometimes a congested and runny nose can be caused by irritants such as tobacco smoke and car exhaust. This condition is called nonallergic rhinitis or vasomotor rhinitis.    Less commonly, nasal congestion can be caused by a tumor.    Potential causes of nasal congestion include:    Acute sinusitis (nasal and sinus infection)  Alcohol  Allergies  Chronic sinusitis  Churg-Javier syndrome  Common cold  Decongestant nasal spray overuse  Deviated septum  Dry air  Enlarged adenoids  Food, especially spicy dishes  Foreign body in the nose  Granulomatosis with polyangiitis (Wegener's granulomatosis)  Hormonal changes  Influenza (flu)  Medications, such as those used to treat high blood pressure, erectile dysfunction, depression, seizures and other conditions  Nasal polyps  Nonallergic rhinitis (chronic congestion or sneezing not related to allergies)  Occupational asthma  Pregnancy  Respiratory syncytial virus (RSV)  Sleep apnea  Stress  Thyroid disorders  Tobacco smoke      For adults – seek medical attention if:  Your symptoms last more than 10 days.  You have a high fever.  Your nasal discharge is yellow or green and you also have sinus pain or fever. This may be a sign of a bacterial infection.  You have blood in your nasal discharge or a persistent clear discharge after a head injury.  For children – seek medical attention if:  Your child is younger than 2 months and has a fever.  Your baby's runny nose or congestion causes trouble nursing or makes breathing difficult.  Self-care  Until you see your doctor, try these simple steps to relieve symptoms:    Try sniffing and swallowing or gently blowing your nose.  Avoid known allergic triggers.  If your runny nose is a persistent, watery discharge, particularly if you're also sneezing and have itchy or watery eyes, your symptoms may be allergy-related, and an over-the-counter antihistamine may help. Be sure to follow the label instructions exactly.  For babies and small children, use a soft, rubber-bulb syringe to gently remove any secretions.  To relieve postnasal drip — when excess saliva (mucus) builds up in the back of your throat – try these measures:    Avoid common irritants such as cigarette smoke and sudden humidity changes.  Drink plenty of water because fluid helps thin nasal secretions.  Try nasal saline sprays or rinses.

## 2023-01-03 NOTE — ED PROVIDER NOTE - ATTENDING CONTRIBUTION TO CARE
52-year-old male with history as above presents valuation of cough nasal congestion.  Patient reports that the nasal congestion is causing lung issues.  He has no fever chills.  Here on exam patient distress S1-S2 clear to auscultation belly soft nontender mild tenderness over the frontal sinuses bilaterally  Impression  Patient here with cough nasal congestion at discussion with patient will prescribe antibiotics for sinus congestion advised outpatient follow-up if not improved.

## 2023-01-03 NOTE — ED PROVIDER NOTE - CLINICAL SUMMARY MEDICAL DECISION MAKING FREE TEXT BOX
Patient here with cough nasal congestion at discussion with patient will prescribe antibiotics for sinus congestion advised outpatient follow-up if not improved.

## 2023-01-03 NOTE — ED PROVIDER NOTE - OBJECTIVE STATEMENT
53 yo male w/ PMH of depression, substance abuse on methadone presents for cough, nasal congestion x 1 week.  Was sick earlier this month with similar sxs, was asx for a week, then started having cough and congestion again.  Reports multiple household sick contacts.  Associated mild nausea that resolved.  No fevers, sore throat, abdominal pain, diarrhea.  Has been using flonase at home but minimally helping with sxs.

## 2023-01-03 NOTE — ED PROVIDER NOTE - NS ED ROS FT
Constitutional: No fevers, chills, or malaise.  HEENT: Reports nasal congestion. No headache  Cardiac:  No chest pain, SOB, leg edema, or leg pain.  Respiratory:  Reports cough.   GI:  Reports mild nausea that resolved. No vomiting, diarrhea, or abdominal pain.  :  No dysuria, frequency, or urgency.  MS:  No myalgia, muscle weakness  Neuro:  No dizziness, LOC, paralysis, or N/T.  Skin:  No skin rash.

## 2023-01-19 ENCOUNTER — OUTPATIENT (OUTPATIENT)
Dept: OUTPATIENT SERVICES | Facility: HOSPITAL | Age: 53
LOS: 1 days | Discharge: HOME | End: 2023-01-19
Payer: COMMERCIAL

## 2023-01-19 DIAGNOSIS — I49.9 CARDIAC ARRHYTHMIA, UNSPECIFIED: ICD-10-CM

## 2023-01-19 PROCEDURE — 93010 ELECTROCARDIOGRAM REPORT: CPT

## 2023-02-07 PROBLEM — Z00.00 ENCOUNTER FOR PREVENTIVE HEALTH EXAMINATION: Status: ACTIVE | Noted: 2023-02-07

## 2023-02-09 ENCOUNTER — APPOINTMENT (OUTPATIENT)
Dept: PULMONOLOGY | Facility: CLINIC | Age: 53
End: 2023-02-09
Payer: MEDICARE

## 2023-02-09 VITALS
WEIGHT: 210 LBS | BODY MASS INDEX: 30.06 KG/M2 | HEIGHT: 70 IN | HEART RATE: 91 BPM | OXYGEN SATURATION: 99 % | RESPIRATION RATE: 14 BRPM | SYSTOLIC BLOOD PRESSURE: 140 MMHG | DIASTOLIC BLOOD PRESSURE: 80 MMHG

## 2023-02-09 DIAGNOSIS — F17.210 NICOTINE DEPENDENCE, CIGARETTES, UNCOMPLICATED: ICD-10-CM

## 2023-02-09 DIAGNOSIS — J45.41 MODERATE PERSISTENT ASTHMA WITH (ACUTE) EXACERBATION: ICD-10-CM

## 2023-02-09 DIAGNOSIS — R05.8 OTHER SPECIFIED COUGH: ICD-10-CM

## 2023-02-09 DIAGNOSIS — K21.9 GASTRO-ESOPHAGEAL REFLUX DISEASE W/OUT ESOPHAGITIS: ICD-10-CM

## 2023-02-09 DIAGNOSIS — F17.200 NICOTINE DEPENDENCE, UNSPECIFIED, UNCOMPLICATED: ICD-10-CM

## 2023-02-09 DIAGNOSIS — M51.27 OTHER INTERVERTEBRAL DISC DISPLACEMENT, LUMBOSACRAL REGION: ICD-10-CM

## 2023-02-09 PROCEDURE — 99205 OFFICE O/P NEW HI 60 MIN: CPT | Mod: 25

## 2023-02-09 PROCEDURE — 99406 BEHAV CHNG SMOKING 3-10 MIN: CPT

## 2023-02-09 RX ORDER — BUDESONIDE AND FORMOTEROL FUMARATE DIHYDRATE 80; 4.5 UG/1; UG/1
80-4.5 AEROSOL RESPIRATORY (INHALATION) 4 TIMES DAILY
Qty: 1 | Refills: 0 | Status: ACTIVE | COMMUNITY
Start: 2023-02-09 | End: 1900-01-01

## 2023-02-09 RX ORDER — PREDNISONE 50 MG/1
50 TABLET ORAL DAILY
Qty: 1 | Refills: 0 | Status: ACTIVE | COMMUNITY
Start: 2023-02-09 | End: 1900-01-01

## 2023-02-09 NOTE — COUNSELING
[Cessation strategies including cessation program discussed] : Cessation strategies including cessation program discussed [Yes] : Willing to quit smoking [FreeTextEntry1] : 3

## 2023-02-09 NOTE — HISTORY OF PRESENT ILLNESS
[Current] : current [TextBox_4] : Mr. ANDRADE is a 52 year man with a medical history significant for OUD on methadone presenting today to the clinic for shortness of breath and productive cough.\par \par He presented to the emergency department in January with about 1 month history of acute onset cough associated with nasal congestion.  He was discharged at that time with 5 days of Augmentin.  Since that time the patient has completed his antibiotics, and has been using albuterol nebulizers, without relief.  The patient reports that he also had another respiratory virus about 2 weeks ago, which was complicated by worsening of his cough.  The cough is worst when laying flat, especially at night.  He often wakes up multiple times per night secondary to cough, also endorses night sweats.  He denies any upper airway tickle leading to cough, rather feels that his cough is emanating from his chest.  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

## 2023-03-09 ENCOUNTER — APPOINTMENT (OUTPATIENT)
Dept: PULMONOLOGY | Facility: CLINIC | Age: 53
End: 2023-03-09

## 2023-05-31 ENCOUNTER — OUTPATIENT (OUTPATIENT)
Dept: OUTPATIENT SERVICES | Facility: HOSPITAL | Age: 53
LOS: 1 days | End: 2023-05-31
Payer: MEDICARE

## 2023-05-31 ENCOUNTER — APPOINTMENT (OUTPATIENT)
Dept: ENDOCRINOLOGY | Facility: CLINIC | Age: 53
End: 2023-05-31

## 2023-05-31 VITALS
HEART RATE: 93 BPM | SYSTOLIC BLOOD PRESSURE: 147 MMHG | WEIGHT: 213 LBS | DIASTOLIC BLOOD PRESSURE: 83 MMHG | HEIGHT: 70 IN | BODY MASS INDEX: 30.49 KG/M2

## 2023-05-31 DIAGNOSIS — E23.0 HYPOPITUITARISM: ICD-10-CM

## 2023-05-31 DIAGNOSIS — E66.9 OBESITY, UNSPECIFIED: ICD-10-CM

## 2023-05-31 DIAGNOSIS — Z00.00 ENCOUNTER FOR GENERAL ADULT MEDICAL EXAMINATION WITHOUT ABNORMAL FINDINGS: ICD-10-CM

## 2023-05-31 PROCEDURE — 99203 OFFICE O/P NEW LOW 30 MIN: CPT

## 2023-07-03 PROBLEM — E66.9 OBESITY: Status: ACTIVE | Noted: 2023-07-03

## 2023-07-03 PROBLEM — E23.0 HYPOPITUITARISM: Status: ACTIVE | Noted: 2023-05-31

## 2024-09-16 NOTE — ED PROVIDER NOTE - NS ED ATTENDING STATEMENT MOD
[Alert] : alert [Obese] : obese [No Acute Distress] : no acute distress [No Proptosis] : no proptosis [No Lid Lag] : no lid lag [No Thyroid Nodules] : no palpable thyroid nodules [No Respiratory Distress] : no respiratory distress [No Accessory Muscle Use] : no accessory muscle use [No Stigmata of Cushings Syndrome] : no stigmata of Cushings Syndrome [Acanthosis Nigricans] : acanthosis nigricans present [Oriented x3] : oriented to person, place, and time [de-identified] : visual  [de-identified] : skipped beats  Attending with

## 2024-12-14 NOTE — ED PROVIDER NOTE - CONSTITUTIONAL, MLM
Well appearing, well nourished, awake, alert, oriented to person, place, time/situation and in no apparent distress. 1 Principal Discharge DX:	Crying in pediatric patient  Secondary Diagnosis:	Acute otitis media   normal...

## 2025-03-27 NOTE — ED PROVIDER NOTE - NS ED NOTE AC HIGH RISK COUNTRIES
"Recommendations from today's MTM visit:                                                    MTM (medication therapy management) is a service provided by a clinical pharmacist designed to help you get the most of out of your medicines.   Today we reviewed what your medicines are for, how to know if they are working, that your medicines are safe and how to make your medicine regimen as easy as possible.      Increase metformin to one tablet in the morning and two tablets in the evening    Follow-up: MyChart when I get your labs back    It was great speaking with you today.  I value your experience and would be very thankful for your time in providing feedback in our clinic survey. In the next few days, you may receive an email or text message from [facility] [employer] with a link to a survey related to your  clinical pharmacist.\"     To schedule another MTM appointment, please call the clinic directly or you may call the MTM scheduling line at [phone] or toll-free at [phone].     My Clinical Pharmacist's contact information:                                                      Please feel free to contact me with any questions or concerns you have.      Ann Mcdowell, PharmD  Medication Therapy Management Resident, [facility]  Phone: [phone]    Faith Montague, Pharm.D., M.B.A., [facility]CP  Medication Therapy Management Pharmacist, [facility]  Phone: [phone]   " No